# Patient Record
Sex: MALE | Race: WHITE | NOT HISPANIC OR LATINO
[De-identification: names, ages, dates, MRNs, and addresses within clinical notes are randomized per-mention and may not be internally consistent; named-entity substitution may affect disease eponyms.]

---

## 2024-07-18 ENCOUNTER — RESULT REVIEW (OUTPATIENT)
Age: 79
End: 2024-07-18

## 2024-07-25 ENCOUNTER — TRANSCRIPTION ENCOUNTER (OUTPATIENT)
Age: 79
End: 2024-07-25

## 2024-07-26 PROBLEM — Z00.00 ENCOUNTER FOR PREVENTIVE HEALTH EXAMINATION: Status: ACTIVE | Noted: 2024-07-26

## 2024-08-02 ENCOUNTER — TRANSCRIPTION ENCOUNTER (OUTPATIENT)
Age: 79
End: 2024-08-02

## 2024-08-05 ENCOUNTER — NON-APPOINTMENT (OUTPATIENT)
Age: 79
End: 2024-08-05

## 2024-08-05 ENCOUNTER — APPOINTMENT (OUTPATIENT)
Dept: FAMILY MEDICINE | Facility: CLINIC | Age: 79
End: 2024-08-05

## 2024-08-05 ENCOUNTER — LABORATORY RESULT (OUTPATIENT)
Age: 79
End: 2024-08-05

## 2024-08-05 PROBLEM — Z09 HOSPITAL DISCHARGE FOLLOW-UP: Status: ACTIVE | Noted: 2024-08-05

## 2024-08-05 PROBLEM — I48.91 ATRIAL FIBRILLATION: Status: ACTIVE | Noted: 2024-08-05

## 2024-08-05 PROBLEM — Z97.8 INDWELLING FOLEY CATHETER PRESENT: Status: ACTIVE | Noted: 2024-08-05

## 2024-08-05 PROBLEM — N39.0 UTI (URINARY TRACT INFECTION): Status: ACTIVE | Noted: 2024-08-05 | Resolved: 2024-09-04

## 2024-08-05 PROBLEM — Z87.891 FORMER SMOKER: Status: ACTIVE | Noted: 2024-08-05

## 2024-08-05 PROBLEM — R33.9 ACUTE ON CHRONIC URINARY RETENTION: Status: ACTIVE | Noted: 2024-08-05

## 2024-08-05 PROCEDURE — 99496 TRANSJ CARE MGMT HIGH F2F 7D: CPT

## 2024-08-05 NOTE — HEALTH RISK ASSESSMENT
[Yes] : Yes [Monthly or less (1 pt)] : Monthly or less (1 point) [1 or 2 (0 pts)] : 1 or 2 (0 points) [Never (0 pts)] : Never (0 points) [0] : 2) Feeling down, depressed, or hopeless: Not at all (0) [PHQ-2 Negative - No further assessment needed] : PHQ-2 Negative - No further assessment needed [Former] : Former [20 or more] : 20 or more [> 15 Years] : > 15 Years [Audit-CScore] : 1 [[id number]] : 0 [de-identified] : smoked pack per day for ~30 years

## 2024-08-05 NOTE — HISTORY OF PRESENT ILLNESS
[Post-hospitalization from ___ Hospital] : Post-hospitalization from [unfilled] Hospital [Admitted on: ___] : The patient was admitted on [unfilled] [Discharged on ___] : discharged on [unfilled] [Discharge Summary] : discharge summary [Pertinent Labs] : pertinent labs [Radiology Findings] : radiology findings [Discharge Med List] : discharge medication list [Med Reconciliation] : medication reconciliation has been completed [FreeTextEntry2] : Patient is a 79-year-old male new patient here for hospital discharge follow-up. Reports history of recently diagnosed high risk GG5 prostate cancer (PSA 39 in 6/2024, s/p prostate biopsy 7/1/24), chronic urinary retention with indwelling coronel initially placed 6/2024.   Hospital course: Presented on 7/16 with fevers, cough, urinary changes and confusion per family.  Called his prior PCP and was given oral antibiotics which she did not tolerate due to nausea/vomiting.  Admitted for sepsis secondary to UTI with metabolic encephalopathy.  He was treated with IV antibiotics of vancomycin/Zosyn and IV fluids.  During his hospitalization he had a rapid response called 7/18 for hypoxia/tachycardia/AMS. Transferred to ICU with new onset A fib RVR, hypoxic resp failure, pulm vascular congestion, severe sepsis. Switched IV merrem. Coronel exchanged by urology on 7/18. Urine Cx final result pan-sensitive E coli and E faecalis, repeat Cx sterile. 7/18 Blood Cx: E coli, presumed UTI source. Switched to IV Unasyn by ICU. ID consulted. rec to switch to augmentin 875mg bid to complete 4 weeks of treatment.  TODAY:  Presents with wife.  Has been taking Augmentin BID.  Urology Dr. Snehal Reynolds appt tomorrow.  Cardiology Dr. Foster appt next week.

## 2024-08-05 NOTE — PHYSICAL EXAM
[Grossly Normal Strength/Tone] : grossly normal strength/tone [Coordination Grossly Intact] : coordination grossly intact [No Focal Deficits] : no focal deficits [Normal Gait] : normal gait [Alert and Oriented x3] : oriented to person, place, and time [Normal] : affect was normal and insight and judgment were intact [57833 - High Complexity requires an extensive number of possible diagnoses and/or the management options, extensive complexity of the medical data (tests, etc.) to be reviewed, and a high risk of significant complications, morbidity, and/or mortality as w] : High Complexity  [de-identified] : 1+ pitting edema bilaterally [de-identified] : indwelling coronel catheter, yellow output in coronel bag

## 2024-08-13 ENCOUNTER — NON-APPOINTMENT (OUTPATIENT)
Age: 79
End: 2024-08-13

## 2024-08-13 ENCOUNTER — APPOINTMENT (OUTPATIENT)
Dept: HEART AND VASCULAR | Facility: CLINIC | Age: 79
End: 2024-08-13
Payer: MEDICARE

## 2024-08-13 VITALS
HEIGHT: 71 IN | BODY MASS INDEX: 29.16 KG/M2 | WEIGHT: 208.31 LBS | SYSTOLIC BLOOD PRESSURE: 100 MMHG | HEART RATE: 90 BPM | OXYGEN SATURATION: 96 % | DIASTOLIC BLOOD PRESSURE: 60 MMHG

## 2024-08-13 DIAGNOSIS — I48.91 UNSPECIFIED ATRIAL FIBRILLATION: ICD-10-CM

## 2024-08-13 DIAGNOSIS — I25.10 ATHEROSCLEROTIC HEART DISEASE OF NATIVE CORONARY ARTERY W/OUT ANGINA PECTORIS: ICD-10-CM

## 2024-08-13 DIAGNOSIS — M79.89 OTHER SPECIFIED SOFT TISSUE DISORDERS: ICD-10-CM

## 2024-08-13 DIAGNOSIS — I70.0 ATHEROSCLEROSIS OF AORTA: ICD-10-CM

## 2024-08-13 DIAGNOSIS — I49.3 VENTRICULAR PREMATURE DEPOLARIZATION: ICD-10-CM

## 2024-08-13 PROCEDURE — 93000 ELECTROCARDIOGRAM COMPLETE: CPT

## 2024-08-13 PROCEDURE — 99215 OFFICE O/P EST HI 40 MIN: CPT

## 2024-08-13 PROCEDURE — G2211 COMPLEX E/M VISIT ADD ON: CPT

## 2024-08-13 RX ORDER — ROSUVASTATIN CALCIUM 10 MG/1
10 TABLET, FILM COATED ORAL
Qty: 90 | Refills: 3 | Status: ACTIVE | COMMUNITY
Start: 2024-08-13 | End: 1900-01-01

## 2024-08-13 RX ORDER — METOPROLOL TARTRATE 25 MG/1
25 TABLET, FILM COATED ORAL
Qty: 90 | Refills: 3 | Status: ACTIVE | COMMUNITY
Start: 2024-08-13 | End: 1900-01-01

## 2024-08-13 NOTE — PHYSICAL EXAM
[Well Developed] : well developed [Well Nourished] : well nourished [No Acute Distress] : no acute distress [Normal Conjunctiva] : normal conjunctiva [Normal Venous Pressure] : normal venous pressure [No Carotid Bruit] : no carotid bruit [Normal S1, S2] : normal S1, S2 [No Murmur] : no murmur [No Rub] : no rub [No Gallop] : no gallop [Clear Lung Fields] : clear lung fields [Good Air Entry] : good air entry [No Respiratory Distress] : no respiratory distress  [Soft] : abdomen soft [Non Tender] : non-tender [No Masses/organomegaly] : no masses/organomegaly [Normal Bowel Sounds] : normal bowel sounds [Moves all extremities] : moves all extremities [No Focal Deficits] : no focal deficits [Normal Speech] : normal speech [No ulcers] : no ulcers [No varicosities] : no varicosities [No chronic venous stasis changes] : no chronic venous stasis changes [No cyanosis] : no cyanosis [No rashes] : no rashes [Normal peripheral pulses] : : normal peripheral pulses [de-identified] : indwelling urinary catheter [de-identified] : 1+ pitting edema b/l

## 2024-08-13 NOTE — CARDIOLOGY SUMMARY
[de-identified] : Echo: 7/18/24: 1. Technically difficult image quality. 2. Left ventricular cavity is normal in size. Left ventricular systolic function is normal with an ejection fraction visually estimated at 50 to 55 %. 3. Left ventricular endocardium is not well visualized; however, the left ventricular systolic function appears grossly normal. 4. The left ventricular diastolic function is indeterminate. 5. The right ventricle is not well visualized. probably normal right ventricular systolic function. 6. The left atrium is moderately dilated. 7. The cardiac valves are not well seen. 8. Mild mitral regurgitation. 9. No pericardial effusion seen. 10. No prior echocardiogram is available for comparison. 11. Consider NANCY if there is clinical indication. [de-identified] : CTA Chest/Abd/Pelvis 7/18/24: IMPRESSION: No pulmonary arterial emboli. Bilateral mid to upper lung predominant groundglass opacities with interlobular septal thickening suggestive of pulmonary edema. Asymmetric right mid to upper lung dependent patchy opacity suggestive of atelectasis however, clinical correlation for superimposed pneumonia is recommended. Urinary bladder wall thickening with surrounding mild perivesicular stranding which can be seen in the setting of cystitis. Clinical correlation is recommended. A few scattered bony sclerotic lesions as described above worrisome for metastatic disease given the history of prostate cancer.  Supraumbilical anterior abdominal wall fat-containing hernia. 1.1 cm internal hypodense nodule with interval increase in size since May 29, 2024 is of unclear etiology. Focused ultrasound is recommended for further evaluation as clinically warranted.

## 2024-08-13 NOTE — ASSESSMENT
[FreeTextEntry1] : 79M   New dx AFIB in setting of urosepsis CHADSVASC 3 - age and coronary/aortic atherosclerosis noted on CTA chest  HLD - , HDL 40  Bilateral Leg Swelling  EKG today NSR freq PVCs  Prior records/imaging personally reviewed from [place] hospitalization  - check BNP, update TTE.  + LE edema on exam. Will also get better assessment now that he is in sinus rhythm.  - stop aspirin, does not need to be on antiplatelet therapy in addition to full dose AC - continue eliquis 5mg BID given stroke risk.  He has had no bleeding events.  - recommend starting moderate dose statin - crestor 10mg given his coronary/aortic plaque, elevated LDL

## 2024-08-13 NOTE — ASSESSMENT
[FreeTextEntry1] : 79M   New dx AFIB in setting of urosepsis CHADSVASC 3 - age and coronary/aortic atherosclerosis noted on CTA chest  HLD - , HDL 40  Bilateral Leg Swelling  EKG today NSR freq PVCs  Prior records/imaging personally reviewed from [place] hospitalization  - check BNP, update TTE.  + LE edema on exam. Will also get better assessment now that he is in sinus rhythm.  - stop aspirin, does not need to be on antiplatelet therapy in addition to full dose AC - continue eliquis 5mg BID given stroke risk.  He has had no bleeding events.  - recommend starting moderate dose statin - crestor 10mg given his coronary/aortic plaque, elevated LDL

## 2024-08-13 NOTE — HISTORY OF PRESENT ILLNESS
[FreeTextEntry1] : 80 y/o M here today for initial post hospitalization visit. Presented to [facility] ED on 7/16 with fever tmax 104, mild cough, dark, strong smelling urine, chills, body aches, transient episode of confusion per family. He called his doctor who prescribed an antibiotic, but after taking just one pill he immediately vomited it up and has not taken any additional pills. Admitted for Sepsis UTI w/ Metabolic Encephalopathy. Treated with IV abx (Vanco/Zosyn), sepsis IVF. Initially improved on Zosyn monotherapy, however RR called 7/18 for hypoxia/tachycardia/AMS. Transferred to ICU with new onset A fib RVR, hypoxic resp failure, pulm vascular congestion, severe sepsis. Elevated troponin secondary to sepsis. Urine Cx final result pan-sensitive E coli and E faecalis, presumed UTI source. Switched to IV Unasyn by ICU, ID recommended switching to augmentin 875mg bid x 4 weeks. PAF converted to NSR on beta blocker and Eliquis. Patient improved and discharged 7/25/24.   8/13/24:  he did not start atenolol due to his baseline low/normal BPs. no cp/sob/palps/dizziness.  no bleeding on eliquis and aspirin

## 2024-08-13 NOTE — PHYSICAL EXAM
[Well Developed] : well developed [Well Nourished] : well nourished [No Acute Distress] : no acute distress [Normal Conjunctiva] : normal conjunctiva [Normal Venous Pressure] : normal venous pressure [No Carotid Bruit] : no carotid bruit [Normal S1, S2] : normal S1, S2 [No Murmur] : no murmur [No Rub] : no rub [No Gallop] : no gallop [Clear Lung Fields] : clear lung fields [Good Air Entry] : good air entry [No Respiratory Distress] : no respiratory distress  [Soft] : abdomen soft [Non Tender] : non-tender [No Masses/organomegaly] : no masses/organomegaly [Normal Bowel Sounds] : normal bowel sounds [Moves all extremities] : moves all extremities [No Focal Deficits] : no focal deficits [Normal Speech] : normal speech [No ulcers] : no ulcers [No varicosities] : no varicosities [No chronic venous stasis changes] : no chronic venous stasis changes [No cyanosis] : no cyanosis [No rashes] : no rashes [Normal peripheral pulses] : : normal peripheral pulses [de-identified] : indwelling urinary catheter [de-identified] : 1+ pitting edema b/l

## 2024-08-13 NOTE — CARDIOLOGY SUMMARY
[de-identified] : Echo: 7/18/24: 1. Technically difficult image quality. 2. Left ventricular cavity is normal in size. Left ventricular systolic function is normal with an ejection fraction visually estimated at 50 to 55 %. 3. Left ventricular endocardium is not well visualized; however, the left ventricular systolic function appears grossly normal. 4. The left ventricular diastolic function is indeterminate. 5. The right ventricle is not well visualized. probably normal right ventricular systolic function. 6. The left atrium is moderately dilated. 7. The cardiac valves are not well seen. 8. Mild mitral regurgitation. 9. No pericardial effusion seen. 10. No prior echocardiogram is available for comparison. 11. Consider NANCY if there is clinical indication. [de-identified] : CTA Chest/Abd/Pelvis 7/18/24: IMPRESSION: No pulmonary arterial emboli. Bilateral mid to upper lung predominant groundglass opacities with interlobular septal thickening suggestive of pulmonary edema. Asymmetric right mid to upper lung dependent patchy opacity suggestive of atelectasis however, clinical correlation for superimposed pneumonia is recommended. Urinary bladder wall thickening with surrounding mild perivesicular stranding which can be seen in the setting of cystitis. Clinical correlation is recommended. A few scattered bony sclerotic lesions as described above worrisome for metastatic disease given the history of prostate cancer.  Supraumbilical anterior abdominal wall fat-containing hernia. 1.1 cm internal hypodense nodule with interval increase in size since May 29, 2024 is of unclear etiology. Focused ultrasound is recommended for further evaluation as clinically warranted.

## 2024-08-23 ENCOUNTER — RESULT REVIEW (OUTPATIENT)
Age: 79
End: 2024-08-23

## 2024-08-27 ENCOUNTER — APPOINTMENT (OUTPATIENT)
Dept: HEMATOLOGY ONCOLOGY | Facility: CLINIC | Age: 79
End: 2024-08-27
Payer: MEDICARE

## 2024-08-27 VITALS
OXYGEN SATURATION: 95 % | DIASTOLIC BLOOD PRESSURE: 73 MMHG | HEART RATE: 79 BPM | HEIGHT: 71 IN | SYSTOLIC BLOOD PRESSURE: 119 MMHG | BODY MASS INDEX: 29.84 KG/M2 | RESPIRATION RATE: 16 BRPM | WEIGHT: 213.13 LBS | TEMPERATURE: 97.2 F

## 2024-08-27 DIAGNOSIS — R61 GENERALIZED HYPERHIDROSIS: ICD-10-CM

## 2024-08-27 DIAGNOSIS — C61 MALIGNANT NEOPLASM OF PROSTATE: ICD-10-CM

## 2024-08-27 DIAGNOSIS — R55 SYNCOPE AND COLLAPSE: ICD-10-CM

## 2024-08-27 PROCEDURE — G2211 COMPLEX E/M VISIT ADD ON: CPT

## 2024-08-27 PROCEDURE — G2212 PROLONG OUTPT/OFFICE VIS: CPT

## 2024-08-27 PROCEDURE — 99205 OFFICE O/P NEW HI 60 MIN: CPT

## 2024-08-27 RX ORDER — PREDNISONE 5 MG/1
5 TABLET ORAL DAILY
Qty: 30 | Refills: 11 | Status: ACTIVE | COMMUNITY
Start: 2024-08-27 | End: 1900-01-01

## 2024-08-28 ENCOUNTER — NON-APPOINTMENT (OUTPATIENT)
Age: 79
End: 2024-08-28

## 2024-08-28 PROBLEM — R61 DIAPHORESIS: Status: ACTIVE | Noted: 2024-08-28

## 2024-08-28 PROBLEM — R55 VASOVAGAL SYNCOPE: Status: ACTIVE | Noted: 2024-08-28

## 2024-08-28 NOTE — HISTORY OF PRESENT ILLNESS
[de-identified] : Mr. Don Hernandez is 79 years old male with prostate cancer here for consultation, referred by Dr.Justen Cassidy Patient is experiencing some hematuria due to the coronel and has trouble doing his ADL's due to the coronel.   Patient with hx of A fib, PVCs, UTI   2024' Final Diagnosis 1.  Right anterior: -   Prostatic adenocarcinoma (Tarlton's pattern 4+4 total score 8) grade group 4 involving 60% of the tissue and measuring 6 mm 2.  Right lateral: -   Prostatic adenocarcinoma (Sky's pattern 4+4 total score 8) grade group 4 involving 70% of the tissue and measuring 6 mm 3.  Right base posterior : -   Prostatic adenocarcinoma (Sky's pattern 4+5 total score 9) grade group 5 involving 100% and 100% of 2 of 2 cores and measuring 5 mm and 4 mm 4.  Right apex posterior: -   Prostatic adenocarcinoma (Sky's pattern 4+4 total score 8) grade group 4 involving 90% of the tissue and measuring 16 mm 5.  Right base posterior medial: -   Benign fibromuscular tissue 6.  Right apex posterior medial: -   Benign prostatic tissue 7.  Left anterior: -   Benign prostatic tissue 8.  Left lateral: -   Prostatic adenocarcinoma (Tarlton's pattern 3+3 total score 6) grade group 1 involving 5% of the tissue and measuring 1 mm 9.  Left base posterior: -   Benign prostatic tissue 10.  Left apex posterior: -   Prostatic adenocarcinoma (Tarlton's pattern 3+3 total score 6) grade group 1 involving 20% of multiple fragmented cores and measuring 2.5 mm 11.  Left base posterior medial: -   Benign prostatic tissue 12.  Left apex posterior medial: -   Prostatic adenocarcinoma (Tarlton's pattern 3+3 total score 6) grade group 1 involving 40% of the tissue and measuring 4 mm 13.  Left transition zone: -   Benign prostatic tissue 14.  Right transition zone: -   Benign prostatic tissue  2024 bone scan - no definite scintigraphic evidence of bony metastatic disease.  2024 CT C/A/P No pulmonary arterial emboli.    Bilateral mid to upper lung predominant groundglass opacities with interlobular septal thickening suggestive of pulmonary edema.    Asymmetric right mid to upper lung dependent patchy opacity suggestive of atelectasis however,  clinical correlation for superimposed pneumonia is recommended.    Urinary bladder wall thickening with surrounding mild perivesicular stranding which can be seen in the setting of cystitis. Clinical correlation is recommended.    A few scattered bony sclerotic lesions as described above worrisome for metastatic disease given  the history of prostate cancer.     Supraumbilical anterior abdominal wall fat-containing hernia. 1.1 cm internal hypodense nodule with interval increase in size since May 29, 2024 is of unclear etiology   2024 Pet/Ct PSMA      REPRODUCTIVE ORGANS: Prostate gland measures 5.3 x 4.1 cm (image activity of the prostate gland  corresponds to the urinary excretion in via Coronel catheter.). Heterogeneous, intense uptake is seen  along the RIGHT lateral peripheral zone, RIGHT posterolateral peripheral zone and RIGHT posterior  midline extending from base to apex (SUV 11.2, image 210). Activity in the center of the prostate  gland corresponds to the urinary excretion in via Coronel catheter.    ABDOMINOPELVIC LYMPH NODES/RETROPERITONEUM: Pelvic nodes are seen with increased uptake. For  example, a RIGHT pelvic sidewall node (1.0 x 0.7 cm, SUV 4.9, image 188).    BONES/SOFT TISSUES: Foci of increased skeletal uptake, including spine, ribs, pelvic bones and  proximal LEFT femur. For reference, the lesion in L1 demonstrates SUV 6.0, a sclerotic lesion in the posterior LEFT iliac measures 1.3 cm with SUV 3.6.    VESSELS: Atherosclerotic calcification of nonaneurysmal abdominal aorta including visceral and/or  runoff branches.       IMPRESSION:    1.  Uptake in the prostate gland consistent with malignant involvement.    2.  Right pelvic sidewall nodes with increased PSMA expression consistent with metastatic disease.    3.  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        FHx: Mother: Neck mass excised and reproductive cancer  at 86yo  Father: Stroke  from artherosclerosis at 58yo MGrandfather - Stomach cancer  at 81yo  MGrandmother: Reproductive cancer  at 74yo   SurgHx: Scoped urologically and Coronel placed  SocialHx: Smoked from 17yo to 51yo  Social ETOH user  Never illicit drugs

## 2024-08-28 NOTE — HISTORY OF PRESENT ILLNESS
[de-identified] : Mr. Don Hernandez is 79 years old male with prostate cancer here for consultation, referred by Dr.Justen Cassidy Patient is experiencing some hematuria due to the coronel and has trouble doing his ADL's due to the coronel.   Patient with hx of A fib, PVCs, UTI   2024' Final Diagnosis 1.  Right anterior: -   Prostatic adenocarcinoma (Pacific's pattern 4+4 total score 8) grade group 4 involving 60% of the tissue and measuring 6 mm 2.  Right lateral: -   Prostatic adenocarcinoma (Sky's pattern 4+4 total score 8) grade group 4 involving 70% of the tissue and measuring 6 mm 3.  Right base posterior : -   Prostatic adenocarcinoma (Sky's pattern 4+5 total score 9) grade group 5 involving 100% and 100% of 2 of 2 cores and measuring 5 mm and 4 mm 4.  Right apex posterior: -   Prostatic adenocarcinoma (Sky's pattern 4+4 total score 8) grade group 4 involving 90% of the tissue and measuring 16 mm 5.  Right base posterior medial: -   Benign fibromuscular tissue 6.  Right apex posterior medial: -   Benign prostatic tissue 7.  Left anterior: -   Benign prostatic tissue 8.  Left lateral: -   Prostatic adenocarcinoma (Pacific's pattern 3+3 total score 6) grade group 1 involving 5% of the tissue and measuring 1 mm 9.  Left base posterior: -   Benign prostatic tissue 10.  Left apex posterior: -   Prostatic adenocarcinoma (Pacific's pattern 3+3 total score 6) grade group 1 involving 20% of multiple fragmented cores and measuring 2.5 mm 11.  Left base posterior medial: -   Benign prostatic tissue 12.  Left apex posterior medial: -   Prostatic adenocarcinoma (Pacific's pattern 3+3 total score 6) grade group 1 involving 40% of the tissue and measuring 4 mm 13.  Left transition zone: -   Benign prostatic tissue 14.  Right transition zone: -   Benign prostatic tissue  2024 bone scan - no definite scintigraphic evidence of bony metastatic disease.  2024 CT C/A/P No pulmonary arterial emboli.    Bilateral mid to upper lung predominant groundglass opacities with interlobular septal thickening suggestive of pulmonary edema.    Asymmetric right mid to upper lung dependent patchy opacity suggestive of atelectasis however,  clinical correlation for superimposed pneumonia is recommended.    Urinary bladder wall thickening with surrounding mild perivesicular stranding which can be seen in the setting of cystitis. Clinical correlation is recommended.    A few scattered bony sclerotic lesions as described above worrisome for metastatic disease given  the history of prostate cancer.     Supraumbilical anterior abdominal wall fat-containing hernia. 1.1 cm internal hypodense nodule with interval increase in size since May 29, 2024 is of unclear etiology   2024 Pet/Ct PSMA      REPRODUCTIVE ORGANS: Prostate gland measures 5.3 x 4.1 cm (image activity of the prostate gland  corresponds to the urinary excretion in via Coronel catheter.). Heterogeneous, intense uptake is seen  along the RIGHT lateral peripheral zone, RIGHT posterolateral peripheral zone and RIGHT posterior  midline extending from base to apex (SUV 11.2, image 210). Activity in the center of the prostate  gland corresponds to the urinary excretion in via Coronel catheter.    ABDOMINOPELVIC LYMPH NODES/RETROPERITONEUM: Pelvic nodes are seen with increased uptake. For  example, a RIGHT pelvic sidewall node (1.0 x 0.7 cm, SUV 4.9, image 188).    BONES/SOFT TISSUES: Foci of increased skeletal uptake, including spine, ribs, pelvic bones and  proximal LEFT femur. For reference, the lesion in L1 demonstrates SUV 6.0, a sclerotic lesion in the posterior LEFT iliac measures 1.3 cm with SUV 3.6.    VESSELS: Atherosclerotic calcification of nonaneurysmal abdominal aorta including visceral and/or  runoff branches.       IMPRESSION:    1.  Uptake in the prostate gland consistent with malignant involvement.    2.  Right pelvic sidewall nodes with increased PSMA expression consistent with metastatic disease.    3.  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        FHx: Mother: Neck mass excised and reproductive cancer  at 86yo  Father: Stroke  from artherosclerosis at 58yo MGrandfather - Stomach cancer  at 79yo  MGrandmother: Reproductive cancer  at 72yo   SurgHx: Scoped urologically and Coronel placed  SocialHx: Smoked from 17yo to 51yo  Social ETOH user  Never illicit drugs

## 2024-08-28 NOTE — PHYSICAL EXAM
[Ambulatory and capable of all self care but unable to carry out any work activities] : Status 2- Ambulatory and capable of all self care but unable to carry out any work activities. Up and about more than 50% of waking hours [Normal] : affect appropriate [de-identified] : Has indwelling Mcneal

## 2024-08-28 NOTE — PHYSICAL EXAM
[Ambulatory and capable of all self care but unable to carry out any work activities] : Status 2- Ambulatory and capable of all self care but unable to carry out any work activities. Up and about more than 50% of waking hours [Normal] : affect appropriate [de-identified] : Has indwelling Mcneal

## 2024-08-28 NOTE — ASSESSMENT
[FreeTextEntry1] : Prostate Cancer Sky 4+5=9 Diagnosed 7/5/24 PSMA PET (8/6/24): Uptake ine prostate, right pelvic side wall and bone mets in axial and appendicular Follows with Dr Reynolds Symptoms: frequent urination until May 2024- he became obstructed s/p Foleys  No pain Discussed at length about the diagnosis, work up, staging, prognosis and treatment options Explained to the patient and the family that he has metastatic prostate cancer which is essentially incurable.  However it is highly treatable. Discussed about the PEACE 1 trial which showed combination of Lupron with docetaxel and abiraterone in metastatic castration sensitive prostate cancer improved overall survival and radiologic progression free survival. We also reviewed the stampede trial, which showed in patients with metastatic prostate cancer ADT plus abiraterone significantly improved overall survival and failure free survival compared to ADT alone He and his family wants to opt for a nonchemo containing regimen Discussed about ADT -> risks, benefits and side effects including fatigue, hot flashes, weight gain, mood swings, decreased libido, erectile dysfunction, increased risk of heart disease and strokes. Also discussed about risk of blood clots generalized bodyaches, myalgia, arthralgia, stiffness and osteoporosis Also discussed about bone agent for bone metastasis after he obtains dental clearance. Plan: Send blood work including CBC, CMP, PSA, testosterone Abiraterone 1000 mg p.o. prednisone 5 mg daily Lupron: He received his first dose of 22.5 mg with Dr. Reynolds on 8/16/2024.  He will continue every 3 months Lupron with his urologist Russel every 3 months  Lifestyle changes: Discussed about lifestyle changes especially given increased risk of cardiac issues, stroke with ADT.  Advised to aim for a goal of 150 minutes/week of cardio.  This can include walking.  He can start of by walking for 5 minutes a day and gradually increase it to achieve the goal. Discussed at length about the diet especially trying whole food plant-based diet for 1 to 3 months. Patient can continue longer if desired. Available evidence about the benefits reviewed and discussed with the patient.  Resources in the form of books, websites, documentaries given to the patient in writing.  Addendum: Towards the end of the encounter, patient started feeling dizzy, became diaphoretic and had a brief [3 to 5 seconds, of syncopal episode.  He did eat his breakfast in the morning Blood pressure was 90s over 50s.  He soon came back and reports that he already started to feel better.  Also received orange juice.  Rapid response was called and he was sent to the emergency room.  Case endorsed to the ED teamteam  Social Hx Lives in [place], [place] with wife Retired  at [employer] Smoked from 17yo to 53yo Social ETOH user Never illicit drugs.  Patient, wife [Nurys], son [Naveen] had multiple questions which were answered to satisfaction  Follow-up in a few weeks No labs  .

## 2024-08-28 NOTE — HISTORY OF PRESENT ILLNESS
[de-identified] : Mr. Don Hernandez is 79 years old male with prostate cancer here for consultation, referred by Dr.Justen Cassidy Patient is experiencing some hematuria due to the coronel and has trouble doing his ADL's due to the coronel.   Patient with hx of A fib, PVCs, UTI   2024' Final Diagnosis 1.  Right anterior: -   Prostatic adenocarcinoma (Greenfield's pattern 4+4 total score 8) grade group 4 involving 60% of the tissue and measuring 6 mm 2.  Right lateral: -   Prostatic adenocarcinoma (Sky's pattern 4+4 total score 8) grade group 4 involving 70% of the tissue and measuring 6 mm 3.  Right base posterior : -   Prostatic adenocarcinoma (Sky's pattern 4+5 total score 9) grade group 5 involving 100% and 100% of 2 of 2 cores and measuring 5 mm and 4 mm 4.  Right apex posterior: -   Prostatic adenocarcinoma (Sky's pattern 4+4 total score 8) grade group 4 involving 90% of the tissue and measuring 16 mm 5.  Right base posterior medial: -   Benign fibromuscular tissue 6.  Right apex posterior medial: -   Benign prostatic tissue 7.  Left anterior: -   Benign prostatic tissue 8.  Left lateral: -   Prostatic adenocarcinoma (Greenfield's pattern 3+3 total score 6) grade group 1 involving 5% of the tissue and measuring 1 mm 9.  Left base posterior: -   Benign prostatic tissue 10.  Left apex posterior: -   Prostatic adenocarcinoma (Greenfield's pattern 3+3 total score 6) grade group 1 involving 20% of multiple fragmented cores and measuring 2.5 mm 11.  Left base posterior medial: -   Benign prostatic tissue 12.  Left apex posterior medial: -   Prostatic adenocarcinoma (Greenfield's pattern 3+3 total score 6) grade group 1 involving 40% of the tissue and measuring 4 mm 13.  Left transition zone: -   Benign prostatic tissue 14.  Right transition zone: -   Benign prostatic tissue  2024 bone scan - no definite scintigraphic evidence of bony metastatic disease.  2024 CT C/A/P No pulmonary arterial emboli.    Bilateral mid to upper lung predominant groundglass opacities with interlobular septal thickening suggestive of pulmonary edema.    Asymmetric right mid to upper lung dependent patchy opacity suggestive of atelectasis however,  clinical correlation for superimposed pneumonia is recommended.    Urinary bladder wall thickening with surrounding mild perivesicular stranding which can be seen in the setting of cystitis. Clinical correlation is recommended.    A few scattered bony sclerotic lesions as described above worrisome for metastatic disease given  the history of prostate cancer.     Supraumbilical anterior abdominal wall fat-containing hernia. 1.1 cm internal hypodense nodule with interval increase in size since May 29, 2024 is of unclear etiology   2024 Pet/Ct [facility]      REPRODUCTIVE ORGANS: Prostate gland measures 5.3 x 4.1 cm (image activity of the prostate gland  corresponds to the urinary excretion in via Coronel catheter.). Heterogeneous, intense uptake is seen  along the RIGHT lateral peripheral zone, RIGHT posterolateral peripheral zone and RIGHT posterior  midline extending from base to apex (SUV 11.2, image 210). Activity in the center of the prostate  gland corresponds to the urinary excretion in via Coronel catheter.    ABDOMINOPELVIC LYMPH NODES/RETROPERITONEUM: Pelvic nodes are seen with increased uptake. For  example, a RIGHT pelvic sidewall node (1.0 x 0.7 cm, SUV 4.9, image 188).    BONES/SOFT TISSUES: Foci of increased skeletal uptake, including spine, ribs, pelvic bones and  proximal LEFT femur. For reference, the lesion in L1 demonstrates SUV 6.0, a sclerotic lesion in the posterior LEFT iliac measures 1.3 cm with SUV 3.6.    VESSELS: Atherosclerotic calcification of nonaneurysmal abdominal aorta including visceral and/or  runoff branches.       IMPRESSION:    1.  Uptake in the prostate gland consistent with malignant involvement.    2.  Right pelvic sidewall nodes with increased PSMA expression consistent with metastatic disease.    3.  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        FHx: Mother: Neck mass excised and reproductive cancer  at 86yo  Father: Stroke  from artherosclerosis at 60yo MGrandfather - Stomach cancer  at 81yo  MGrandmother: Reproductive cancer  at 74yo   SurgHx: Scoped urologically and Coronel placed  SocialHx: Smoked from 17yo to 51yo  Social ETOH user  Never illicit drugs

## 2024-08-28 NOTE — ASSESSMENT
[FreeTextEntry1] : Prostate Cancer Sky 4+5=9 Diagnosed 7/5/24 PSMA PET (8/6/24): Uptake ine prostate, right pelvic side wall and bone mets in axial and appendicular Follows with Dr Reynolds Symptoms: frequent urination until May 2024- he became obstructed s/p Foleys  No pain Discussed at length about the diagnosis, work up, staging, prognosis and treatment options Explained to the patient and the family that he has metastatic prostate cancer which is essentially incurable.  However it is highly treatable. Discussed about the PEACE 1 trial which showed combination of Lupron with docetaxel and abiraterone in metastatic castration sensitive prostate cancer improved overall survival and radiologic progression free survival. We also reviewed the stampede trial, which showed in patients with metastatic prostate cancer ADT plus abiraterone significantly improved overall survival and failure free survival compared to ADT alone He and his family wants to opt for a nonchemo containing regimen Discussed about ADT -> risks, benefits and side effects including fatigue, hot flashes, weight gain, mood swings, decreased libido, erectile dysfunction, increased risk of heart disease and strokes. Also discussed about risk of blood clots generalized bodyaches, myalgia, arthralgia, stiffness and osteoporosis Also discussed about bone agent for bone metastasis after he obtains dental clearance. Plan: Send blood work including CBC, CMP, PSA, testosterone Abiraterone 1000 mg p.o. prednisone 5 mg daily Lupron: He received his first dose of 22.5 mg with Dr. Reynolds on 8/16/2024.  He will continue every 3 months Lupron with his urologist Russel every 3 months  Lifestyle changes: Discussed about lifestyle changes especially given increased risk of cardiac issues, stroke with ADT.  Advised to aim for a goal of 150 minutes/week of cardio.  This can include walking.  He can start of by walking for 5 minutes a day and gradually increase it to achieve the goal. Discussed at length about the diet especially trying whole food plant-based diet for 1 to 3 months. Patient can continue longer if desired. Available evidence about the benefits reviewed and discussed with the patient.  Resources in the form of books, websites, documentaries given to the patient in writing.  Addendum: Towards the end of the encounter, patient started feeling dizzy, became diaphoretic and had a brief [3 to 5 seconds, of syncopal episode.  He did eat his breakfast in the morning Blood pressure was 90s over 50s.  He soon came back and reports that he already started to feel better.  Also received orange juice.  Rapid response was called and he was sent to the emergency room.  Case endorsed to the ED teamteam  Social Hx Lives in [place], [place] with wife Retired  at [employer] Smoked from 15yo to 53yo Social ETOH user Never illicit drugs.  Patient, wife [Nurys], son [Naveen] had multiple questions which were answered to satisfaction  Follow-up in a few weeks No labs  .

## 2024-08-28 NOTE — HISTORY OF PRESENT ILLNESS
[de-identified] : Mr. Don Hernandez is 79 years old male with prostate cancer here for consultation, referred by Dr.Justen Cassidy Patient is experiencing some hematuria due to the coronel and has trouble doing his ADL's due to the coronel.   Patient with hx of A fib, PVCs, UTI   2024' Final Diagnosis 1.  Right anterior: -   Prostatic adenocarcinoma (Hansville's pattern 4+4 total score 8) grade group 4 involving 60% of the tissue and measuring 6 mm 2.  Right lateral: -   Prostatic adenocarcinoma (Sky's pattern 4+4 total score 8) grade group 4 involving 70% of the tissue and measuring 6 mm 3.  Right base posterior : -   Prostatic adenocarcinoma (Sky's pattern 4+5 total score 9) grade group 5 involving 100% and 100% of 2 of 2 cores and measuring 5 mm and 4 mm 4.  Right apex posterior: -   Prostatic adenocarcinoma (Sky's pattern 4+4 total score 8) grade group 4 involving 90% of the tissue and measuring 16 mm 5.  Right base posterior medial: -   Benign fibromuscular tissue 6.  Right apex posterior medial: -   Benign prostatic tissue 7.  Left anterior: -   Benign prostatic tissue 8.  Left lateral: -   Prostatic adenocarcinoma (Hansville's pattern 3+3 total score 6) grade group 1 involving 5% of the tissue and measuring 1 mm 9.  Left base posterior: -   Benign prostatic tissue 10.  Left apex posterior: -   Prostatic adenocarcinoma (Hansville's pattern 3+3 total score 6) grade group 1 involving 20% of multiple fragmented cores and measuring 2.5 mm 11.  Left base posterior medial: -   Benign prostatic tissue 12.  Left apex posterior medial: -   Prostatic adenocarcinoma (Hansville's pattern 3+3 total score 6) grade group 1 involving 40% of the tissue and measuring 4 mm 13.  Left transition zone: -   Benign prostatic tissue 14.  Right transition zone: -   Benign prostatic tissue  2024 bone scan - no definite scintigraphic evidence of bony metastatic disease.  2024 CT C/A/P No pulmonary arterial emboli.    Bilateral mid to upper lung predominant groundglass opacities with interlobular septal thickening suggestive of pulmonary edema.    Asymmetric right mid to upper lung dependent patchy opacity suggestive of atelectasis however,  clinical correlation for superimposed pneumonia is recommended.    Urinary bladder wall thickening with surrounding mild perivesicular stranding which can be seen in the setting of cystitis. Clinical correlation is recommended.    A few scattered bony sclerotic lesions as described above worrisome for metastatic disease given  the history of prostate cancer.     Supraumbilical anterior abdominal wall fat-containing hernia. 1.1 cm internal hypodense nodule with interval increase in size since May 29, 2024 is of unclear etiology   2024 Pet/Ct PSMA      REPRODUCTIVE ORGANS: Prostate gland measures 5.3 x 4.1 cm (image activity of the prostate gland  corresponds to the urinary excretion in via Coronel catheter.). Heterogeneous, intense uptake is seen  along the RIGHT lateral peripheral zone, RIGHT posterolateral peripheral zone and RIGHT posterior  midline extending from base to apex (SUV 11.2, image 210). Activity in the center of the prostate  gland corresponds to the urinary excretion in via Coronel catheter.    ABDOMINOPELVIC LYMPH NODES/RETROPERITONEUM: Pelvic nodes are seen with increased uptake. For  example, a RIGHT pelvic sidewall node (1.0 x 0.7 cm, SUV 4.9, image 188).    BONES/SOFT TISSUES: Foci of increased skeletal uptake, including spine, ribs, pelvic bones and  proximal LEFT femur. For reference, the lesion in L1 demonstrates SUV 6.0, a sclerotic lesion in the posterior LEFT iliac measures 1.3 cm with SUV 3.6.    VESSELS: Atherosclerotic calcification of nonaneurysmal abdominal aorta including visceral and/or  runoff branches.       IMPRESSION:    1.  Uptake in the prostate gland consistent with malignant involvement.    2.  Right pelvic sidewall nodes with increased PSMA expression consistent with metastatic disease.    3.  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        FHx: Mother: Neck mass excised and reproductive cancer  at 88yo  Father: Stroke  from artherosclerosis at 58yo MGrandfather - Stomach cancer  at 81yo  MGrandmother: Reproductive cancer  at 74yo   SurgHx: Scoped urologically and Coronel placed  SocialHx: Smoked from 17yo to 51yo  Social ETOH user  Never illicit drugs

## 2024-08-28 NOTE — ASSESSMENT
[FreeTextEntry1] : Prostate Cancer Sky 4+5=9 Diagnosed 7/5/24 PSMA PET (8/6/24): Uptake ine prostate, right pelvic side wall and bone mets in axial and appendicular Follows with Dr Reynolds Symptoms: frequent urination until May 2024- he became obstructed s/p Foleys  No pain Discussed at length about the diagnosis, work up, staging, prognosis and treatment options Explained to the patient and the family that he has metastatic prostate cancer which is essentially incurable.  However it is highly treatable. Discussed about the PEACE 1 trial which showed combination of Lupron with docetaxel and abiraterone in metastatic castration sensitive prostate cancer improved overall survival and radiologic progression free survival. We also reviewed the stampede trial, which showed in patients with metastatic prostate cancer ADT plus abiraterone significantly improved overall survival and failure free survival compared to ADT alone He and his family wants to opt for a nonchemo containing regimen Discussed about ADT -> risks, benefits and side effects including fatigue, hot flashes, weight gain, mood swings, decreased libido, erectile dysfunction, increased risk of heart disease and strokes. Also discussed about risk of blood clots generalized bodyaches, myalgia, arthralgia, stiffness and osteoporosis Also discussed about bone agent for bone metastasis after he obtains dental clearance. Plan: Send blood work including CBC, CMP, PSA, testosterone Abiraterone 1000 mg p.o. prednisone 5 mg daily Lupron: He received his first dose of 22.5 mg with Dr. Reynolds on 8/16/2024.  He will continue every 3 months Lupron with his urologist Russel every 3 months  Lifestyle changes: Discussed about lifestyle changes especially given increased risk of cardiac issues, stroke with ADT.  Advised to aim for a goal of 150 minutes/week of cardio.  This can include walking.  He can start of by walking for 5 minutes a day and gradually increase it to achieve the goal. Discussed at length about the diet especially trying whole food plant-based diet for 1 to 3 months. Patient can continue longer if desired. Available evidence about the benefits reviewed and discussed with the patient.  Resources in the form of books, websites, documentaries given to the patient in writing.  Addendum: Towards the end of the encounter, patient started feeling dizzy, became diaphoretic and had a brief [3 to 5 seconds, of syncopal episode.  He did eat his breakfast in the morning Blood pressure was 90s over 50s.  He soon came back and reports that he already started to feel better.  Also received orange juice.  Rapid response was called and he was sent to the emergency room.  Case endorsed to the ED teamteam  Social Hx Lives in [place], [place] with wife Retired  at [employer] Smoked from 15yo to 51yo Social ETOH user Never illicit drugs.  Patient, wife [Nurys], son [Naveen] had multiple questions which were answered to satisfaction  Follow-up in a few weeks No labs  .

## 2024-08-28 NOTE — PHYSICAL EXAM
[Ambulatory and capable of all self care but unable to carry out any work activities] : Status 2- Ambulatory and capable of all self care but unable to carry out any work activities. Up and about more than 50% of waking hours [Normal] : affect appropriate [de-identified] : Has indwelling Mcneal

## 2024-08-28 NOTE — PHYSICAL EXAM
[Ambulatory and capable of all self care but unable to carry out any work activities] : Status 2- Ambulatory and capable of all self care but unable to carry out any work activities. Up and about more than 50% of waking hours [Normal] : affect appropriate [de-identified] : Has indwelling Mcneal

## 2024-08-28 NOTE — ASSESSMENT
[FreeTextEntry1] : Prostate Cancer Sky 4+5=9 Diagnosed 7/5/24 PSMA PET (8/6/24): Uptake ine prostate, right pelvic side wall and bone mets in axial and appendicular Follows with Dr Reynolds Symptoms: frequent urination until May 2024- he became obstructed s/p Foleys  No pain Discussed at length about the diagnosis, work up, staging, prognosis and treatment options Explained to the patient and the family that he has metastatic prostate cancer which is essentially incurable.  However it is highly treatable. Discussed about the PEACE 1 trial which showed combination of Lupron with docetaxel and abiraterone in metastatic castration sensitive prostate cancer improved overall survival and radiologic progression free survival. We also reviewed the stampede trial, which showed in patients with metastatic prostate cancer ADT plus abiraterone significantly improved overall survival and failure free survival compared to ADT alone He and his family wants to opt for a nonchemo containing regimen Discussed about ADT -> risks, benefits and side effects including fatigue, hot flashes, weight gain, mood swings, decreased libido, erectile dysfunction, increased risk of heart disease and strokes. Also discussed about risk of blood clots generalized bodyaches, myalgia, arthralgia, stiffness and osteoporosis Also discussed about bone agent for bone metastasis after he obtains dental clearance. Plan: Send blood work including CBC, CMP, PSA, testosterone Abiraterone 1000 mg p.o. prednisone 5 mg daily Lupron: He received his first dose of 22.5 mg with Dr. Reynolds on 8/16/2024.  He will continue every 3 months Lupron with his urologist Russel every 3 months  Lifestyle changes: Discussed about lifestyle changes especially given increased risk of cardiac issues, stroke with ADT.  Advised to aim for a goal of 150 minutes/week of cardio.  This can include walking.  He can start of by walking for 5 minutes a day and gradually increase it to achieve the goal. Discussed at length about the diet especially trying whole food plant-based diet for 1 to 3 months. Patient can continue longer if desired. Available evidence about the benefits reviewed and discussed with the patient.  Resources in the form of books, websites, documentaries given to the patient in writing.  Addendum: Towards the end of the encounter, patient started feeling dizzy, became diaphoretic and had a brief [3 to 5 seconds, of syncopal episode.  He did eat his breakfast in the morning Blood pressure was 90s over 50s.  He soon came back and reports that he already started to feel better.  Also received orange juice.  Rapid response was called and he was sent to the emergency room.  Case endorsed to the ED teamteam  Social Hx Lives in [place], [place] with wife Retired  at [employer] Smoked from 15yo to 53yo Social ETOH user Never illicit drugs.  Patient, wife [Nurys], son [Naveen] had multiple questions which were answered to satisfaction  Follow-up in a few weeks No labs  .

## 2024-08-28 NOTE — PHYSICAL EXAM
[Ambulatory and capable of all self care but unable to carry out any work activities] : Status 2- Ambulatory and capable of all self care but unable to carry out any work activities. Up and about more than 50% of waking hours [Normal] : affect appropriate [de-identified] : Has indwelling Mcneal

## 2024-08-28 NOTE — HISTORY OF PRESENT ILLNESS
[de-identified] : Mr. Don Hernandez is 79 years old male with prostate cancer here for consultation, referred by Dr.Justen Cassidy Patient is experiencing some hematuria due to the coronel and has trouble doing his ADL's due to the coronel.   Patient with hx of A fib, PVCs, UTI   2024' Final Diagnosis 1.  Right anterior: -   Prostatic adenocarcinoma (Cameron's pattern 4+4 total score 8) grade group 4 involving 60% of the tissue and measuring 6 mm 2.  Right lateral: -   Prostatic adenocarcinoma (Sky's pattern 4+4 total score 8) grade group 4 involving 70% of the tissue and measuring 6 mm 3.  Right base posterior : -   Prostatic adenocarcinoma (Sky's pattern 4+5 total score 9) grade group 5 involving 100% and 100% of 2 of 2 cores and measuring 5 mm and 4 mm 4.  Right apex posterior: -   Prostatic adenocarcinoma (Sky's pattern 4+4 total score 8) grade group 4 involving 90% of the tissue and measuring 16 mm 5.  Right base posterior medial: -   Benign fibromuscular tissue 6.  Right apex posterior medial: -   Benign prostatic tissue 7.  Left anterior: -   Benign prostatic tissue 8.  Left lateral: -   Prostatic adenocarcinoma (Cameron's pattern 3+3 total score 6) grade group 1 involving 5% of the tissue and measuring 1 mm 9.  Left base posterior: -   Benign prostatic tissue 10.  Left apex posterior: -   Prostatic adenocarcinoma (Cameron's pattern 3+3 total score 6) grade group 1 involving 20% of multiple fragmented cores and measuring 2.5 mm 11.  Left base posterior medial: -   Benign prostatic tissue 12.  Left apex posterior medial: -   Prostatic adenocarcinoma (Cameron's pattern 3+3 total score 6) grade group 1 involving 40% of the tissue and measuring 4 mm 13.  Left transition zone: -   Benign prostatic tissue 14.  Right transition zone: -   Benign prostatic tissue  2024 bone scan - no definite scintigraphic evidence of bony metastatic disease.  2024 CT C/A/P No pulmonary arterial emboli.    Bilateral mid to upper lung predominant groundglass opacities with interlobular septal thickening suggestive of pulmonary edema.    Asymmetric right mid to upper lung dependent patchy opacity suggestive of atelectasis however,  clinical correlation for superimposed pneumonia is recommended.    Urinary bladder wall thickening with surrounding mild perivesicular stranding which can be seen in the setting of cystitis. Clinical correlation is recommended.    A few scattered bony sclerotic lesions as described above worrisome for metastatic disease given  the history of prostate cancer.     Supraumbilical anterior abdominal wall fat-containing hernia. 1.1 cm internal hypodense nodule with interval increase in size since May 29, 2024 is of unclear etiology   2024 Pet/Ct PSMA      REPRODUCTIVE ORGANS: Prostate gland measures 5.3 x 4.1 cm (image activity of the prostate gland  corresponds to the urinary excretion in via Coronel catheter.). Heterogeneous, intense uptake is seen  along the RIGHT lateral peripheral zone, RIGHT posterolateral peripheral zone and RIGHT posterior  midline extending from base to apex (SUV 11.2, image 210). Activity in the center of the prostate  gland corresponds to the urinary excretion in via Coronel catheter.    ABDOMINOPELVIC LYMPH NODES/RETROPERITONEUM: Pelvic nodes are seen with increased uptake. For  example, a RIGHT pelvic sidewall node (1.0 x 0.7 cm, SUV 4.9, image 188).    BONES/SOFT TISSUES: Foci of increased skeletal uptake, including spine, ribs, pelvic bones and  proximal LEFT femur. For reference, the lesion in L1 demonstrates SUV 6.0, a sclerotic lesion in the posterior LEFT iliac measures 1.3 cm with SUV 3.6.    VESSELS: Atherosclerotic calcification of nonaneurysmal abdominal aorta including visceral and/or  runoff branches.       IMPRESSION:    1.  Uptake in the prostate gland consistent with malignant involvement.    2.  Right pelvic sidewall nodes with increased PSMA expression consistent with metastatic disease.    3.  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        FHx: Mother: Neck mass excised and reproductive cancer  at 88yo  Father: Stroke  from artherosclerosis at 58yo MGrandfather - Stomach cancer  at 81yo  MGrandmother: Reproductive cancer  at 72yo   SurgHx: Scoped urologically and Coronel placed  SocialHx: Smoked from 17yo to 51yo  Social ETOH user  Never illicit drugs

## 2024-08-30 ENCOUNTER — APPOINTMENT (OUTPATIENT)
Dept: HEMATOLOGY ONCOLOGY | Facility: CLINIC | Age: 79
End: 2024-08-30

## 2024-08-30 ENCOUNTER — RESULT REVIEW (OUTPATIENT)
Age: 79
End: 2024-08-30

## 2024-08-30 VITALS
WEIGHT: 210.13 LBS | TEMPERATURE: 97.1 F | BODY MASS INDEX: 29.42 KG/M2 | DIASTOLIC BLOOD PRESSURE: 69 MMHG | HEART RATE: 76 BPM | OXYGEN SATURATION: 97 % | SYSTOLIC BLOOD PRESSURE: 117 MMHG | HEIGHT: 71 IN | RESPIRATION RATE: 16 BRPM

## 2024-08-30 RX ORDER — ABIRATERONE ACETATE 250 MG/1
250 TABLET, FILM COATED ORAL DAILY
Qty: 120 | Refills: 11 | Status: ACTIVE | COMMUNITY
Start: 2024-08-30 | End: 1900-01-01

## 2024-08-30 RX ORDER — ABIRATERONE ACETATE 500 MG/1
500 TABLET, FILM COATED ORAL
Qty: 180 | Refills: 3 | Status: DISCONTINUED | COMMUNITY
Start: 2024-08-27 | End: 2024-08-30

## 2024-09-03 ENCOUNTER — RESULT REVIEW (OUTPATIENT)
Age: 79
End: 2024-09-03

## 2024-09-05 ENCOUNTER — APPOINTMENT (OUTPATIENT)
Dept: HEMATOLOGY ONCOLOGY | Facility: CLINIC | Age: 79
End: 2024-09-05
Payer: MEDICARE

## 2024-09-05 ENCOUNTER — APPOINTMENT (OUTPATIENT)
Dept: HEART AND VASCULAR | Facility: CLINIC | Age: 79
End: 2024-09-05

## 2024-09-05 VITALS
DIASTOLIC BLOOD PRESSURE: 67 MMHG | TEMPERATURE: 97.1 F | BODY MASS INDEX: 29.6 KG/M2 | OXYGEN SATURATION: 99 % | SYSTOLIC BLOOD PRESSURE: 128 MMHG | RESPIRATION RATE: 18 BRPM | HEART RATE: 74 BPM | WEIGHT: 211.44 LBS | HEIGHT: 71 IN

## 2024-09-05 DIAGNOSIS — C61 MALIGNANT NEOPLASM OF PROSTATE: ICD-10-CM

## 2024-09-05 DIAGNOSIS — C79.51 MALIGNANT NEOPLASM OF PROSTATE: ICD-10-CM

## 2024-09-05 PROCEDURE — 99214 OFFICE O/P EST MOD 30 MIN: CPT

## 2024-09-05 PROCEDURE — G2211 COMPLEX E/M VISIT ADD ON: CPT

## 2024-09-05 RX ORDER — BICALUTAMIDE 50 MG/1
TABLET ORAL
Refills: 0 | Status: ACTIVE | COMMUNITY

## 2024-09-05 RX ORDER — APIXABAN 5 MG/1
TABLET, FILM COATED ORAL
Refills: 0 | Status: ACTIVE | COMMUNITY

## 2024-09-05 NOTE — REASON FOR VISIT
[Initial Consultation] : an initial consultation [Follow-Up Visit] : a follow-up [FreeTextEntry2] : prostate cancer

## 2024-09-05 NOTE — PHYSICAL EXAM
[Ambulatory and capable of all self care but unable to carry out any work activities] : Status 2- Ambulatory and capable of all self care but unable to carry out any work activities. Up and about more than 50% of waking hours [Normal] : affect appropriate [de-identified] : Has indwelling Mcneal

## 2024-09-05 NOTE — ASSESSMENT
[FreeTextEntry1] : Prostate Cancer Sky 4+5=9 Diagnosed 7/5/24 PSMA PET (8/6/24): Uptake in prostate, right pelvic side wall and bone mets in axial and appendicular Follows with Dr Reynolds Symptoms: frequent urination until May 2024- he became obstructed s/p Foleys  No pain  He is seen today for follow-up His workup in the emergency room was negative and was discharged home the same day No further episodes of passing out He continues to take Casodex daily Due for Lupron in November 2024 which she will get with Dr Reynolds Awaiting insurance approval and shipment of abiraterone and prednisone His insurance approved 250 mg x 4 pills only and not for 100 mg x 2 pills I strongly reiterated for him to stop taking Casodex once he receives and begins abiraterone Also discussed about bone health given extensive bony metastasis.  Recommend Zometa every 3 months He has average dentition and does not follow with dentist.  I advised him to see a dentist and obtain dental clearance prior to starting Zometa Plan: Abiraterone 1000 mg p.o. prednisone 5 mg daily Lupron: He received his first dose of 22.5 mg with Dr. Reynolds on 8/16/2024.  He will continue every 3 months Lupron with his urologist Zometa every 3 months  Social Hx Lives in [place], [place] with wife Retired  at [employer] Smoked from 17yo to 51yo Social ETOH user Never illicit drugs.  Patient, wife [Nurys], son [Naveen] had multiple questions which were answered to satisfaction  Follow-up in a 5 to 6 weeks to begin Zometa as long as he has dental clearance CBC, CMP, PSA  .

## 2024-09-05 NOTE — ASSESSMENT
[FreeTextEntry1] : Prostate Cancer Sky 4+5=9 Diagnosed 7/5/24 PSMA PET (8/6/24): Uptake in prostate, right pelvic side wall and bone mets in axial and appendicular Follows with Dr Renyolds Symptoms: frequent urination until May 2024- he became obstructed s/p Foleys  No pain  He is seen today for follow-up His workup in the emergency room was negative and was discharged home the same day No further episodes of passing out He continues to take Casodex daily Due for Lupron in November 2024 which she will get with Dr Reynolds Awaiting insurance approval and shipment of abiraterone and prednisone His insurance approved 250 mg x 4 pills only and not for 100 mg x 2 pills I strongly reiterated for him to stop taking Casodex once he receives and begins abiraterone Also discussed about bone health given extensive bony metastasis.  Recommend Zometa every 3 months He has average dentition and does not follow with dentist.  I advised him to see a dentist and obtain dental clearance prior to starting Zometa Plan: Abiraterone 1000 mg p.o. prednisone 5 mg daily Lupron: He received his first dose of 22.5 mg with Dr. Reynolds on 8/16/2024.  He will continue every 3 months Lupron with his urologist Zometa every 3 months  Social Hx Lives in [place], [place] with wife Retired  at [employer] Smoked from 15yo to 53yo Social ETOH user Never illicit drugs.  Patient, wife [Nurys], son [aNveen] had multiple questions which were answered to satisfaction  Follow-up in a 5 to 6 weeks to begin Zometa as long as he has dental clearance CBC, CMP, PSA  .

## 2024-09-05 NOTE — HISTORY OF PRESENT ILLNESS
[de-identified] : Mr. Don Hernandez is 79 years old male with prostate cancer here for consultation, referred by Dr.Justen Cassidy Patient is experiencing some hematuria due to the coronel and has trouble doing his ADL's due to the coronel.   Patient with hx of A fib, PVCs, UTI   2024' Final Diagnosis 1.  Right anterior: -   Prostatic adenocarcinoma ([place]'s pattern 4+4 total score 8) grade group 4 involving 60% of the tissue and measuring 6 mm 2.  Right lateral: -   Prostatic adenocarcinoma (Sky's pattern 4+4 total score 8) grade group 4 involving 70% of the tissue and measuring 6 mm 3.  Right base posterior : -   Prostatic adenocarcinoma (Sky's pattern 4+5 total score 9) grade group 5 involving 100% and 100% of 2 of 2 cores and measuring 5 mm and 4 mm 4.  Right apex posterior: -   Prostatic adenocarcinoma (Sky's pattern 4+4 total score 8) grade group 4 involving 90% of the tissue and measuring 16 mm 5.  Right base posterior medial: -   Benign fibromuscular tissue 6.  Right apex posterior medial: -   Benign prostatic tissue 7.  Left anterior: -   Benign prostatic tissue 8.  Left lateral: -   Prostatic adenocarcinoma ([place]'s pattern 3+3 total score 6) grade group 1 involving 5% of the tissue and measuring 1 mm 9.  Left base posterior: -   Benign prostatic tissue 10.  Left apex posterior: -   Prostatic adenocarcinoma ([place]'s pattern 3+3 total score 6) grade group 1 involving 20% of multiple fragmented cores and measuring 2.5 mm 11.  Left base posterior medial: -   Benign prostatic tissue 12.  Left apex posterior medial: -   Prostatic adenocarcinoma ([place]'s pattern 3+3 total score 6) grade group 1 involving 40% of the tissue and measuring 4 mm 13.  Left transition zone: -   Benign prostatic tissue 14.  Right transition zone: -   Benign prostatic tissue  2024 bone scan - no definite scintigraphic evidence of bony metastatic disease.  2024 CT C/A/P No pulmonary arterial emboli.    Bilateral mid to upper lung predominant groundglass opacities with interlobular septal thickening suggestive of pulmonary edema.    Asymmetric right mid to upper lung dependent patchy opacity suggestive of atelectasis however,  clinical correlation for superimposed pneumonia is recommended.    Urinary bladder wall thickening with surrounding mild perivesicular stranding which can be seen in the setting of cystitis. Clinical correlation is recommended.    A few scattered bony sclerotic lesions as described above worrisome for metastatic disease given  the history of prostate cancer.     Supraumbilical anterior abdominal wall fat-containing hernia. 1.1 cm internal hypodense nodule with interval increase in size since May 29, 2024 is of unclear etiology   2024 Pet/Ct [facility]      REPRODUCTIVE ORGANS: Prostate gland measures 5.3 x 4.1 cm (image activity of the prostate gland  corresponds to the urinary excretion in via Coronel catheter.). Heterogeneous, intense uptake is seen  along the RIGHT lateral peripheral zone, RIGHT posterolateral peripheral zone and RIGHT posterior  midline extending from base to apex (SUV 11.2, image 210). Activity in the center of the prostate  gland corresponds to the urinary excretion in via Coronel catheter.    ABDOMINOPELVIC LYMPH NODES/RETROPERITONEUM: Pelvic nodes are seen with increased uptake. For  example, a RIGHT pelvic sidewall node (1.0 x 0.7 cm, SUV 4.9, image 188).    BONES/SOFT TISSUES: Foci of increased skeletal uptake, including spine, ribs, pelvic bones and  proximal LEFT femur. For reference, the lesion in L1 demonstrates SUV 6.0, a sclerotic lesion in the posterior LEFT iliac measures 1.3 cm with SUV 3.6.    VESSELS: Atherosclerotic calcification of nonaneurysmal abdominal aorta including visceral and/or  runoff branches.       IMPRESSION:    1.  Uptake in the prostate gland consistent with malignant involvement.    2.  Right pelvic sidewall nodes with increased PSMA expression consistent with metastatic disease.    3.  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        FHx: Mother: Neck mass excised and reproductive cancer  at 86yo  Father: Stroke  from artherosclerosis at 60yo MGrandfather - Stomach cancer  at 81yo  MGrandmother: Reproductive cancer  at 74yo   SurgHx: Scoped urologically and Coronel placed  SocialHx: Smoked from 15yo to 51yo  Social ETOH user  Never illicit drugs  [de-identified] : Patient is seen today for follow up Accompanied by wife [Nurys], son [Naveen]   Pt overall feels "fine,"  Complains of his indwelling bladder catheter that is attached to his leg. Schedule for greenlight laser surgery with Dr Reynolds (9/9/24)

## 2024-09-05 NOTE — HISTORY OF PRESENT ILLNESS
[de-identified] : Mr. Don Hernandez is 79 years old male with prostate cancer here for consultation, referred by Dr.Justen Cassidy Patient is experiencing some hematuria due to the coronel and has trouble doing his ADL's due to the coronel.   Patient with hx of A fib, PVCs, UTI   2024' Final Diagnosis 1.  Right anterior: -   Prostatic adenocarcinoma ([place]'s pattern 4+4 total score 8) grade group 4 involving 60% of the tissue and measuring 6 mm 2.  Right lateral: -   Prostatic adenocarcinoma (Sky's pattern 4+4 total score 8) grade group 4 involving 70% of the tissue and measuring 6 mm 3.  Right base posterior : -   Prostatic adenocarcinoma (Sky's pattern 4+5 total score 9) grade group 5 involving 100% and 100% of 2 of 2 cores and measuring 5 mm and 4 mm 4.  Right apex posterior: -   Prostatic adenocarcinoma (Sky's pattern 4+4 total score 8) grade group 4 involving 90% of the tissue and measuring 16 mm 5.  Right base posterior medial: -   Benign fibromuscular tissue 6.  Right apex posterior medial: -   Benign prostatic tissue 7.  Left anterior: -   Benign prostatic tissue 8.  Left lateral: -   Prostatic adenocarcinoma ([place]'s pattern 3+3 total score 6) grade group 1 involving 5% of the tissue and measuring 1 mm 9.  Left base posterior: -   Benign prostatic tissue 10.  Left apex posterior: -   Prostatic adenocarcinoma ([place]'s pattern 3+3 total score 6) grade group 1 involving 20% of multiple fragmented cores and measuring 2.5 mm 11.  Left base posterior medial: -   Benign prostatic tissue 12.  Left apex posterior medial: -   Prostatic adenocarcinoma ([place]'s pattern 3+3 total score 6) grade group 1 involving 40% of the tissue and measuring 4 mm 13.  Left transition zone: -   Benign prostatic tissue 14.  Right transition zone: -   Benign prostatic tissue  2024 bone scan - no definite scintigraphic evidence of bony metastatic disease.  2024 CT C/A/P No pulmonary arterial emboli.    Bilateral mid to upper lung predominant groundglass opacities with interlobular septal thickening suggestive of pulmonary edema.    Asymmetric right mid to upper lung dependent patchy opacity suggestive of atelectasis however,  clinical correlation for superimposed pneumonia is recommended.    Urinary bladder wall thickening with surrounding mild perivesicular stranding which can be seen in the setting of cystitis. Clinical correlation is recommended.    A few scattered bony sclerotic lesions as described above worrisome for metastatic disease given  the history of prostate cancer.     Supraumbilical anterior abdominal wall fat-containing hernia. 1.1 cm internal hypodense nodule with interval increase in size since May 29, 2024 is of unclear etiology   2024 Pet/Ct PSMA      REPRODUCTIVE ORGANS: Prostate gland measures 5.3 x 4.1 cm (image activity of the prostate gland  corresponds to the urinary excretion in via Coronel catheter.). Heterogeneous, intense uptake is seen  along the RIGHT lateral peripheral zone, RIGHT posterolateral peripheral zone and RIGHT posterior  midline extending from base to apex (SUV 11.2, image 210). Activity in the center of the prostate  gland corresponds to the urinary excretion in via Coronel catheter.    ABDOMINOPELVIC LYMPH NODES/RETROPERITONEUM: Pelvic nodes are seen with increased uptake. For  example, a RIGHT pelvic sidewall node (1.0 x 0.7 cm, SUV 4.9, image 188).    BONES/SOFT TISSUES: Foci of increased skeletal uptake, including spine, ribs, pelvic bones and  proximal LEFT femur. For reference, the lesion in L1 demonstrates SUV 6.0, a sclerotic lesion in the posterior LEFT iliac measures 1.3 cm with SUV 3.6.    VESSELS: Atherosclerotic calcification of nonaneurysmal abdominal aorta including visceral and/or  runoff branches.       IMPRESSION:    1.  Uptake in the prostate gland consistent with malignant involvement.    2.  Right pelvic sidewall nodes with increased PSMA expression consistent with metastatic disease.    3.  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        FHx: Mother: Neck mass excised and reproductive cancer  at 86yo  Father: Stroke  from artherosclerosis at 60yo MGrandfather - Stomach cancer  at 81yo  MGrandmother: Reproductive cancer  at 72yo   SurgHx: Scoped urologically and Coronel placed  SocialHx: Smoked from 17yo to 51yo  Social ETOH user  Never illicit drugs  [de-identified] : Patient is seen today for follow up Accompanied by wife [Nurys], son [Naveen]   Pt overall feels "fine,"  Complains of his indwelling bladder catheter that is attached to his leg. Schedule for greenlight laser surgery with Dr Reynolds (9/9/24)

## 2024-09-05 NOTE — PHYSICAL EXAM
[Ambulatory and capable of all self care but unable to carry out any work activities] : Status 2- Ambulatory and capable of all self care but unable to carry out any work activities. Up and about more than 50% of waking hours [Normal] : affect appropriate [de-identified] : Has indwelling Mcneal

## 2024-09-10 ENCOUNTER — TRANSCRIPTION ENCOUNTER (OUTPATIENT)
Age: 79
End: 2024-09-10

## 2024-10-03 ENCOUNTER — RESULT REVIEW (OUTPATIENT)
Age: 79
End: 2024-10-03

## 2024-10-03 ENCOUNTER — APPOINTMENT (OUTPATIENT)
Dept: HEMATOLOGY ONCOLOGY | Facility: CLINIC | Age: 79
End: 2024-10-03
Payer: MEDICARE

## 2024-10-03 ENCOUNTER — NON-APPOINTMENT (OUTPATIENT)
Age: 79
End: 2024-10-03

## 2024-10-03 VITALS
TEMPERATURE: 97.4 F | HEART RATE: 72 BPM | RESPIRATION RATE: 16 BRPM | SYSTOLIC BLOOD PRESSURE: 124 MMHG | OXYGEN SATURATION: 96 % | BODY MASS INDEX: 29.16 KG/M2 | WEIGHT: 208.31 LBS | DIASTOLIC BLOOD PRESSURE: 72 MMHG | HEIGHT: 71 IN

## 2024-10-03 DIAGNOSIS — G89.3 NEOPLASM RELATED PAIN (ACUTE) (CHRONIC): ICD-10-CM

## 2024-10-03 DIAGNOSIS — C61 MALIGNANT NEOPLASM OF PROSTATE: ICD-10-CM

## 2024-10-03 DIAGNOSIS — C79.51 MALIGNANT NEOPLASM OF PROSTATE: ICD-10-CM

## 2024-10-03 DIAGNOSIS — C79.51 NEOPLASM RELATED PAIN (ACUTE) (CHRONIC): ICD-10-CM

## 2024-10-03 PROCEDURE — 36415 COLL VENOUS BLD VENIPUNCTURE: CPT

## 2024-10-03 PROCEDURE — G2211 COMPLEX E/M VISIT ADD ON: CPT

## 2024-10-03 PROCEDURE — 99214 OFFICE O/P EST MOD 30 MIN: CPT

## 2024-10-03 NOTE — PHYSICAL EXAM
[Ambulatory and capable of all self care but unable to carry out any work activities] : Status 2- Ambulatory and capable of all self care but unable to carry out any work activities. Up and about more than 50% of waking hours [Normal] : affect appropriate [de-identified] : Has indwelling Mcneal

## 2024-10-03 NOTE — PHYSICAL EXAM
[Ambulatory and capable of all self care but unable to carry out any work activities] : Status 2- Ambulatory and capable of all self care but unable to carry out any work activities. Up and about more than 50% of waking hours [Normal] : affect appropriate [de-identified] : Has indwelling Mcneal

## 2024-10-03 NOTE — HISTORY OF PRESENT ILLNESS
[de-identified] : Mr. Don Hernandez is 79 years old male with prostate cancer here for consultation, referred by Dr.Justen Cassidy Patient is experiencing some hematuria due to the coronel and has trouble doing his ADL's due to the coronel.   Patient with hx of A fib, PVCs, UTI   2024' Final Diagnosis 1.  Right anterior: -   Prostatic adenocarcinoma ([place]'s pattern 4+4 total score 8) grade group 4 involving 60% of the tissue and measuring 6 mm 2.  Right lateral: -   Prostatic adenocarcinoma (Sky's pattern 4+4 total score 8) grade group 4 involving 70% of the tissue and measuring 6 mm 3.  Right base posterior : -   Prostatic adenocarcinoma (Sky's pattern 4+5 total score 9) grade group 5 involving 100% and 100% of 2 of 2 cores and measuring 5 mm and 4 mm 4.  Right apex posterior: -   Prostatic adenocarcinoma (Sky's pattern 4+4 total score 8) grade group 4 involving 90% of the tissue and measuring 16 mm 5.  Right base posterior medial: -   Benign fibromuscular tissue 6.  Right apex posterior medial: -   Benign prostatic tissue 7.  Left anterior: -   Benign prostatic tissue 8.  Left lateral: -   Prostatic adenocarcinoma ([place]'s pattern 3+3 total score 6) grade group 1 involving 5% of the tissue and measuring 1 mm 9.  Left base posterior: -   Benign prostatic tissue 10.  Left apex posterior: -   Prostatic adenocarcinoma ([place]'s pattern 3+3 total score 6) grade group 1 involving 20% of multiple fragmented cores and measuring 2.5 mm 11.  Left base posterior medial: -   Benign prostatic tissue 12.  Left apex posterior medial: -   Prostatic adenocarcinoma ([place]'s pattern 3+3 total score 6) grade group 1 involving 40% of the tissue and measuring 4 mm 13.  Left transition zone: -   Benign prostatic tissue 14.  Right transition zone: -   Benign prostatic tissue  2024 bone scan - no definite scintigraphic evidence of bony metastatic disease.  2024 CT C/A/P No pulmonary arterial emboli.    Bilateral mid to upper lung predominant groundglass opacities with interlobular septal thickening suggestive of pulmonary edema.    Asymmetric right mid to upper lung dependent patchy opacity suggestive of atelectasis however,  clinical correlation for superimposed pneumonia is recommended.    Urinary bladder wall thickening with surrounding mild perivesicular stranding which can be seen in the setting of cystitis. Clinical correlation is recommended.    A few scattered bony sclerotic lesions as described above worrisome for metastatic disease given  the history of prostate cancer.     Supraumbilical anterior abdominal wall fat-containing hernia. 1.1 cm internal hypodense nodule with interval increase in size since May 29, 2024 is of unclear etiology   2024 Pet/Ct PSMA      REPRODUCTIVE ORGANS: Prostate gland measures 5.3 x 4.1 cm (image activity of the prostate gland  corresponds to the urinary excretion in via Coronel catheter.). Heterogeneous, intense uptake is seen  along the RIGHT lateral peripheral zone, RIGHT posterolateral peripheral zone and RIGHT posterior  midline extending from base to apex (SUV 11.2, image 210). Activity in the center of the prostate  gland corresponds to the urinary excretion in via Coronel catheter.    ABDOMINOPELVIC LYMPH NODES/RETROPERITONEUM: Pelvic nodes are seen with increased uptake. For  example, a RIGHT pelvic sidewall node (1.0 x 0.7 cm, SUV 4.9, image 188).    BONES/SOFT TISSUES: Foci of increased skeletal uptake, including spine, ribs, pelvic bones and  proximal LEFT femur. For reference, the lesion in L1 demonstrates SUV 6.0, a sclerotic lesion in the posterior LEFT iliac measures 1.3 cm with SUV 3.6.    VESSELS: Atherosclerotic calcification of nonaneurysmal abdominal aorta including visceral and/or  runoff branches.       IMPRESSION:    1.  Uptake in the prostate gland consistent with malignant involvement.    2.  Right pelvic sidewall nodes with increased PSMA expression consistent with metastatic disease.    3.  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        FHx: Mother: Neck mass excised and reproductive cancer  at 88yo  Father: Stroke  from artherosclerosis at 60yo MGrandfather - Stomach cancer  at 79yo  MGrandmother: Reproductive cancer  at 74yo   SurgHx: Scoped urologically and Coronel placed  SocialHx: Smoked from 15yo to 51yo  Social ETOH user  Never illicit drugs  [de-identified] : Patient is seen today for follow up He started abiraterone prednisone (9/16/24-present) Accompanied by wife [Nurys]  He feels much better He is s/p greenlight laser surgery with Dr Reynolds (9/9/24) and indwelling cath was discontinued 9/11/24 He has good urine flow, with occasional hematuria he has been taking abiraterone prednisone x 2 weeks. Takes 2 pills in AM and 2 pills in PM. I advised him to take all 4 pills together  He gets occasional hot flashes.  He saw Dr Olivia (Dentist) on 9/27/24-> scheduled for extractions next week Explained that he cannot get bone agent for 2 months

## 2024-10-03 NOTE — BEGINNING OF VISIT
[0] : 2) Feeling down, depressed, or hopeless: Not at all (0) [PHQ-2 Negative] : PHQ-2 Negative [Pain Scale: ___] : On a scale of 1-10, today the patient's pain is a(n) [unfilled]. [Reviewed, no changes] : Reviewed, no changes [[id number]] : 0

## 2024-10-03 NOTE — HISTORY OF PRESENT ILLNESS
[de-identified] : Mr. Don Hernandez is 79 years old male with prostate cancer here for consultation, referred by Dr.Justen Cassidy Patient is experiencing some hematuria due to the coronel and has trouble doing his ADL's due to the coronel.   Patient with hx of A fib, PVCs, UTI   2024' Final Diagnosis 1.  Right anterior: -   Prostatic adenocarcinoma ([place]'s pattern 4+4 total score 8) grade group 4 involving 60% of the tissue and measuring 6 mm 2.  Right lateral: -   Prostatic adenocarcinoma (Sky's pattern 4+4 total score 8) grade group 4 involving 70% of the tissue and measuring 6 mm 3.  Right base posterior : -   Prostatic adenocarcinoma (Sky's pattern 4+5 total score 9) grade group 5 involving 100% and 100% of 2 of 2 cores and measuring 5 mm and 4 mm 4.  Right apex posterior: -   Prostatic adenocarcinoma (Sky's pattern 4+4 total score 8) grade group 4 involving 90% of the tissue and measuring 16 mm 5.  Right base posterior medial: -   Benign fibromuscular tissue 6.  Right apex posterior medial: -   Benign prostatic tissue 7.  Left anterior: -   Benign prostatic tissue 8.  Left lateral: -   Prostatic adenocarcinoma ([place]'s pattern 3+3 total score 6) grade group 1 involving 5% of the tissue and measuring 1 mm 9.  Left base posterior: -   Benign prostatic tissue 10.  Left apex posterior: -   Prostatic adenocarcinoma ([place]'s pattern 3+3 total score 6) grade group 1 involving 20% of multiple fragmented cores and measuring 2.5 mm 11.  Left base posterior medial: -   Benign prostatic tissue 12.  Left apex posterior medial: -   Prostatic adenocarcinoma ([place]'s pattern 3+3 total score 6) grade group 1 involving 40% of the tissue and measuring 4 mm 13.  Left transition zone: -   Benign prostatic tissue 14.  Right transition zone: -   Benign prostatic tissue  2024 bone scan - no definite scintigraphic evidence of bony metastatic disease.  2024 CT C/A/P No pulmonary arterial emboli.    Bilateral mid to upper lung predominant groundglass opacities with interlobular septal thickening suggestive of pulmonary edema.    Asymmetric right mid to upper lung dependent patchy opacity suggestive of atelectasis however,  clinical correlation for superimposed pneumonia is recommended.    Urinary bladder wall thickening with surrounding mild perivesicular stranding which can be seen in the setting of cystitis. Clinical correlation is recommended.    A few scattered bony sclerotic lesions as described above worrisome for metastatic disease given  the history of prostate cancer.     Supraumbilical anterior abdominal wall fat-containing hernia. 1.1 cm internal hypodense nodule with interval increase in size since May 29, 2024 is of unclear etiology   2024 Pet/Ct PSMA      REPRODUCTIVE ORGANS: Prostate gland measures 5.3 x 4.1 cm (image activity of the prostate gland  corresponds to the urinary excretion in via Coronel catheter.). Heterogeneous, intense uptake is seen  along the RIGHT lateral peripheral zone, RIGHT posterolateral peripheral zone and RIGHT posterior  midline extending from base to apex (SUV 11.2, image 210). Activity in the center of the prostate  gland corresponds to the urinary excretion in via Coronel catheter.    ABDOMINOPELVIC LYMPH NODES/RETROPERITONEUM: Pelvic nodes are seen with increased uptake. For  example, a RIGHT pelvic sidewall node (1.0 x 0.7 cm, SUV 4.9, image 188).    BONES/SOFT TISSUES: Foci of increased skeletal uptake, including spine, ribs, pelvic bones and  proximal LEFT femur. For reference, the lesion in L1 demonstrates SUV 6.0, a sclerotic lesion in the posterior LEFT iliac measures 1.3 cm with SUV 3.6.    VESSELS: Atherosclerotic calcification of nonaneurysmal abdominal aorta including visceral and/or  runoff branches.       IMPRESSION:    1.  Uptake in the prostate gland consistent with malignant involvement.    2.  Right pelvic sidewall nodes with increased PSMA expression consistent with metastatic disease.    3.  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        FHx: Mother: Neck mass excised and reproductive cancer  at 86yo  Father: Stroke  from artherosclerosis at 58yo MGrandfather - Stomach cancer  at 81yo  MGrandmother: Reproductive cancer  at 74yo   SurgHx: Scoped urologically and Coronel placed  SocialHx: Smoked from 17yo to 51yo  Social ETOH user  Never illicit drugs  [de-identified] : Patient is seen today for follow up He started abiraterone prednisone (9/16/24-present) Accompanied by wife [Nurys]  He feels much better He is s/p greenlight laser surgery with Dr Reynolds (9/9/24) and indwelling cath was discontinued 9/11/24 He has good urine flow, with occasional hematuria he has been taking abiraterone prednisone x 2 weeks. Takes 2 pills in AM and 2 pills in PM. I advised him to take all 4 pills together  He gets occasional hot flashes.  He saw Dr Olivia (Dentist) on 9/27/24-> scheduled for extractions next week Explained that he cannot get bone agent for 2 months

## 2024-10-03 NOTE — BEGINNING OF VISIT
[0] : 2) Feeling down, depressed, or hopeless: Not at all (0) [PHQ-2 Negative] : PHQ-2 Negative [Pain Scale: ___] : On a scale of 1-10, today the patient's pain is a(n) [unfilled]. [Reviewed, no changes] : Reviewed, no changes [[id number]] : 0

## 2024-10-03 NOTE — ASSESSMENT
[FreeTextEntry1] : Prostate Cancer Sky 4+5=9 Diagnosed 7/5/24 PSMA PET (8/6/24): Uptake in prostate, right pelvic side wall and bone mets in axial and appendicular Follows with Dr Reynolds Symptoms: frequent urination until May 2024- he became obstructed s/p Foleys  No pain  He is seen today for follow-up started abiraterone prednisone (9/16/24-present) He is taking abiraterone 2 pills in the morning and 2 pills at night.  Advised him to take 4 pills altogether in the morning. No further episodes of syncope/presyncope He has discontinued Casodex Due for Lupron in November 2024 which she will get with Dr Reynolds Recommend Zometa every 3 months He has seen dentist [Dr. Olivia] and requires dental extraction which is scheduled for next week Explained that we will hold off on the bone agent until he obtains dental clearance  Plan: Abiraterone 1000 mg p.o. prednisone 5 mg daily Lupron: He received his first dose of 22.5 mg with Dr. Reynolds on 8/16/2024.  He will continue every 3 months Lupron with his urologist Zometa every 3 months-once he obtains dental clearance  Social Hx Lives in [place], [place] with wife Retired  at [employer] Smoked from 15yo to 53yo Social ETOH user Never illicit drugs.  Patient, wife [Nurys], had multiple questions which were answered to satisfaction  Follow-up in 2 weeks for 2 to 3 months to monitor labs on abiraterone CBC, CMP, PSA  .

## 2024-10-03 NOTE — ASSESSMENT
[FreeTextEntry1] : Prostate Cancer Sky 4+5=9 Diagnosed 7/5/24 [facility] PET (8/6/24): Uptake in prostate, right pelvic side wall and bone mets in axial and appendicular Follows with Dr Reynolds Symptoms: frequent urination until May 2024- he became obstructed s/p Foleys  No pain  He is seen today for follow-up started abiraterone prednisone (9/16/24-present) He is taking abiraterone 2 pills in the morning and 2 pills at night.  Advised him to take 4 pills altogether in the morning. No further episodes of syncope/presyncope He has discontinued Casodex Due for Lupron in November 2024 which she will get with Dr Reynolds Recommend Zometa every 3 months He has seen dentist [Dr. Olivia] and requires dental extraction which is scheduled for next week Explained that we will hold off on the bone agent until he obtains dental clearance  Plan: Abiraterone 1000 mg p.o. prednisone 5 mg daily Lupron: He received his first dose of 22.5 mg with Dr. Reynolds on 8/16/2024.  He will continue every 3 months Lupron with his urologist Zometa every 3 months-once he obtains dental clearance  Social Hx Lives in [place], [place] with wife Retired  at [employer] Smoked from 15yo to 53yo Social ETOH user Never illicit drugs.  Patient, wife [Nurys], had multiple questions which were answered to satisfaction  Follow-up in 2 weeks for 2 to 3 months to monitor labs on abiraterone CBC, CMP, PSA  .

## 2024-10-03 NOTE — PHYSICAL EXAM
[Ambulatory and capable of all self care but unable to carry out any work activities] : Status 2- Ambulatory and capable of all self care but unable to carry out any work activities. Up and about more than 50% of waking hours [Normal] : affect appropriate [de-identified] : Has indwelling Mnceal

## 2024-10-03 NOTE — ASSESSMENT
[FreeTextEntry1] : Prostate Cancer Sky 4+5=9 Diagnosed 7/5/24 PSMA PET (8/6/24): Uptake in prostate, right pelvic side wall and bone mets in axial and appendicular Follows with Dr Reynolds Symptoms: frequent urination until May 2024- he became obstructed s/p Foleys  No pain  He is seen today for follow-up started abiraterone prednisone (9/16/24-present) He is taking abiraterone 2 pills in the morning and 2 pills at night.  Advised him to take 4 pills altogether in the morning. No further episodes of syncope/presyncope He has discontinued Casodex Due for Lupron in November 2024 which she will get with Dr Reynolds Recommend Zometa every 3 months He has seen dentist [Dr. Olivia] and requires dental extraction which is scheduled for next week Explained that we will hold off on the bone agent until he obtains dental clearance  Plan: Abiraterone 1000 mg p.o. prednisone 5 mg daily Lupron: He received his first dose of 22.5 mg with Dr. Reynolds on 8/16/2024.  He will continue every 3 months Lupron with his urologist Zometa every 3 months-once he obtains dental clearance  Social Hx Lives in [place], [place] with wife Retired  at [employer] Smoked from 17yo to 53yo Social ETOH user Never illicit drugs.  Patient, wife [Nurys], had multiple questions which were answered to satisfaction  Follow-up in 2 weeks for 2 to 3 months to monitor labs on abiraterone CBC, CMP, PSA  .

## 2024-10-03 NOTE — BEGINNING OF VISIT
[0] : 2) Feeling down, depressed, or hopeless: Not at all (0) [PHQ-2 Negative] : PHQ-2 Negative [Pain Scale: ___] : On a scale of 1-10, today the patient's pain is a(n) [unfilled]. [Reviewed, no changes] : Reviewed, no changes [[id number]] : 0

## 2024-10-03 NOTE — HISTORY OF PRESENT ILLNESS
[de-identified] : Mr. Don Hernandez is 79 years old male with prostate cancer here for consultation, referred by Dr.Justen Cassidy Patient is experiencing some hematuria due to the coronel and has trouble doing his ADL's due to the coronel.   Patient with hx of A fib, PVCs, UTI   2024' Final Diagnosis 1.  Right anterior: -   Prostatic adenocarcinoma ([place]'s pattern 4+4 total score 8) grade group 4 involving 60% of the tissue and measuring 6 mm 2.  Right lateral: -   Prostatic adenocarcinoma ([place]'s pattern 4+4 total score 8) grade group 4 involving 70% of the tissue and measuring 6 mm 3.  Right base posterior : -   Prostatic adenocarcinoma (Sky's pattern 4+5 total score 9) grade group 5 involving 100% and 100% of 2 of 2 cores and measuring 5 mm and 4 mm 4.  Right apex posterior: -   Prostatic adenocarcinoma (Sky's pattern 4+4 total score 8) grade group 4 involving 90% of the tissue and measuring 16 mm 5.  Right base posterior medial: -   Benign fibromuscular tissue 6.  Right apex posterior medial: -   Benign prostatic tissue 7.  Left anterior: -   Benign prostatic tissue 8.  Left lateral: -   Prostatic adenocarcinoma ([place]'s pattern 3+3 total score 6) grade group 1 involving 5% of the tissue and measuring 1 mm 9.  Left base posterior: -   Benign prostatic tissue 10.  Left apex posterior: -   Prostatic adenocarcinoma ([place]'s pattern 3+3 total score 6) grade group 1 involving 20% of multiple fragmented cores and measuring 2.5 mm 11.  Left base posterior medial: -   Benign prostatic tissue 12.  Left apex posterior medial: -   Prostatic adenocarcinoma ([place]'s pattern 3+3 total score 6) grade group 1 involving 40% of the tissue and measuring 4 mm 13.  Left transition zone: -   Benign prostatic tissue 14.  Right transition zone: -   Benign prostatic tissue  2024 bone scan - no definite scintigraphic evidence of bony metastatic disease.  2024 CT C/A/P No pulmonary arterial emboli.    Bilateral mid to upper lung predominant groundglass opacities with interlobular septal thickening suggestive of pulmonary edema.    Asymmetric right mid to upper lung dependent patchy opacity suggestive of atelectasis however,  clinical correlation for superimposed pneumonia is recommended.    Urinary bladder wall thickening with surrounding mild perivesicular stranding which can be seen in the setting of cystitis. Clinical correlation is recommended.    A few scattered bony sclerotic lesions as described above worrisome for metastatic disease given  the history of prostate cancer.     Supraumbilical anterior abdominal wall fat-containing hernia. 1.1 cm internal hypodense nodule with interval increase in size since May 29, 2024 is of unclear etiology   2024 Pet/Ct [facility]      REPRODUCTIVE ORGANS: Prostate gland measures 5.3 x 4.1 cm (image activity of the prostate gland  corresponds to the urinary excretion in via Coronel catheter.). Heterogeneous, intense uptake is seen  along the RIGHT lateral peripheral zone, RIGHT posterolateral peripheral zone and RIGHT posterior  midline extending from base to apex (SUV 11.2, image 210). Activity in the center of the prostate  gland corresponds to the urinary excretion in via Coronel catheter.    ABDOMINOPELVIC LYMPH NODES/RETROPERITONEUM: Pelvic nodes are seen with increased uptake. For  example, a RIGHT pelvic sidewall node (1.0 x 0.7 cm, SUV 4.9, image 188).    BONES/SOFT TISSUES: Foci of increased skeletal uptake, including spine, ribs, pelvic bones and  proximal LEFT femur. For reference, the lesion in L1 demonstrates SUV 6.0, a sclerotic lesion in the posterior LEFT iliac measures 1.3 cm with SUV 3.6.    VESSELS: Atherosclerotic calcification of nonaneurysmal abdominal aorta including visceral and/or  runoff branches.       IMPRESSION:    1.  Uptake in the prostate gland consistent with malignant involvement.    2.  Right pelvic sidewall nodes with increased PSMA expression consistent with metastatic disease.    3.  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        FHx: Mother: Neck mass excised and reproductive cancer  at 86yo  Father: Stroke  from artherosclerosis at 58yo MGrandfather - Stomach cancer  at 81yo  MGrandmother: Reproductive cancer  at 72yo   SurgHx: Scoped urologically and Coronel placed  SocialHx: Smoked from 15yo to 53yo  Social ETOH user  Never illicit drugs  [de-identified] : Patient is seen today for follow up He started abiraterone prednisone (9/16/24-present) Accompanied by wife [Nurys]  He feels much better He is s/p greenlight laser surgery with Dr Reynolds (9/9/24) and indwelling cath was discontinued 9/11/24 He has good urine flow, with occasional hematuria he has been taking abiraterone prednisone x 2 weeks. Takes 2 pills in AM and 2 pills in PM. I advised him to take all 4 pills together  He gets occasional hot flashes.  He saw Dr Olivia (Dentist) on 9/27/24-> scheduled for extractions next week Explained that he cannot get bone agent for 2 months

## 2024-10-23 ENCOUNTER — RESULT REVIEW (OUTPATIENT)
Age: 79
End: 2024-10-23

## 2024-10-23 ENCOUNTER — APPOINTMENT (OUTPATIENT)
Dept: HEMATOLOGY ONCOLOGY | Facility: CLINIC | Age: 79
End: 2024-10-23
Payer: MEDICARE

## 2024-10-23 VITALS
OXYGEN SATURATION: 97 % | SYSTOLIC BLOOD PRESSURE: 123 MMHG | BODY MASS INDEX: 29.15 KG/M2 | DIASTOLIC BLOOD PRESSURE: 75 MMHG | HEIGHT: 71 IN | TEMPERATURE: 98.1 F | HEART RATE: 70 BPM | WEIGHT: 208.19 LBS | RESPIRATION RATE: 16 BRPM

## 2024-10-23 DIAGNOSIS — C61 MALIGNANT NEOPLASM OF PROSTATE: ICD-10-CM

## 2024-10-23 DIAGNOSIS — C79.51 MALIGNANT NEOPLASM OF PROSTATE: ICD-10-CM

## 2024-10-23 PROCEDURE — 99214 OFFICE O/P EST MOD 30 MIN: CPT

## 2024-10-23 PROCEDURE — G2211 COMPLEX E/M VISIT ADD ON: CPT

## 2024-10-23 PROCEDURE — 36415 COLL VENOUS BLD VENIPUNCTURE: CPT

## 2024-10-24 ENCOUNTER — NON-APPOINTMENT (OUTPATIENT)
Age: 79
End: 2024-10-24

## 2024-10-24 ENCOUNTER — APPOINTMENT (OUTPATIENT)
Dept: HEART AND VASCULAR | Facility: CLINIC | Age: 79
End: 2024-10-24
Payer: MEDICARE

## 2024-10-24 VITALS
BODY MASS INDEX: 28.42 KG/M2 | DIASTOLIC BLOOD PRESSURE: 80 MMHG | OXYGEN SATURATION: 99 % | HEART RATE: 76 BPM | SYSTOLIC BLOOD PRESSURE: 130 MMHG | WEIGHT: 203 LBS | HEIGHT: 71 IN

## 2024-10-24 DIAGNOSIS — I49.3 VENTRICULAR PREMATURE DEPOLARIZATION: ICD-10-CM

## 2024-10-24 DIAGNOSIS — M79.89 OTHER SPECIFIED SOFT TISSUE DISORDERS: ICD-10-CM

## 2024-10-24 DIAGNOSIS — I25.10 ATHEROSCLEROTIC HEART DISEASE OF NATIVE CORONARY ARTERY W/OUT ANGINA PECTORIS: ICD-10-CM

## 2024-10-24 DIAGNOSIS — C61 MALIGNANT NEOPLASM OF PROSTATE: ICD-10-CM

## 2024-10-24 DIAGNOSIS — I70.0 ATHEROSCLEROSIS OF AORTA: ICD-10-CM

## 2024-10-24 DIAGNOSIS — I48.91 UNSPECIFIED ATRIAL FIBRILLATION: ICD-10-CM

## 2024-10-24 PROCEDURE — 99214 OFFICE O/P EST MOD 30 MIN: CPT

## 2024-10-24 PROCEDURE — 93000 ELECTROCARDIOGRAM COMPLETE: CPT

## 2024-10-24 PROCEDURE — G2211 COMPLEX E/M VISIT ADD ON: CPT

## 2024-10-25 LAB — NT-PROBNP SERPL-MCNC: 137 PG/ML

## 2024-11-08 ENCOUNTER — APPOINTMENT (OUTPATIENT)
Dept: HEMATOLOGY ONCOLOGY | Facility: CLINIC | Age: 79
End: 2024-11-08

## 2024-12-03 ENCOUNTER — APPOINTMENT (OUTPATIENT)
Dept: UROLOGY | Facility: CLINIC | Age: 79
End: 2024-12-03
Payer: MEDICARE

## 2024-12-03 VITALS
SYSTOLIC BLOOD PRESSURE: 164 MMHG | BODY MASS INDEX: 29.12 KG/M2 | DIASTOLIC BLOOD PRESSURE: 94 MMHG | HEIGHT: 71 IN | HEART RATE: 84 BPM | WEIGHT: 208 LBS

## 2024-12-03 DIAGNOSIS — C79.51 MALIGNANT NEOPLASM OF PROSTATE: ICD-10-CM

## 2024-12-03 DIAGNOSIS — C61 MALIGNANT NEOPLASM OF PROSTATE: ICD-10-CM

## 2024-12-03 PROCEDURE — 96402 CHEMO HORMON ANTINEOPL SQ/IM: CPT

## 2024-12-03 PROCEDURE — 99213 OFFICE O/P EST LOW 20 MIN: CPT | Mod: 25

## 2024-12-03 PROCEDURE — 99203 OFFICE O/P NEW LOW 30 MIN: CPT | Mod: 25

## 2025-01-02 ENCOUNTER — APPOINTMENT (OUTPATIENT)
Dept: HEMATOLOGY ONCOLOGY | Facility: CLINIC | Age: 80
End: 2025-01-02
Payer: MEDICARE

## 2025-01-02 ENCOUNTER — RESULT REVIEW (OUTPATIENT)
Age: 80
End: 2025-01-02

## 2025-01-02 VITALS
SYSTOLIC BLOOD PRESSURE: 146 MMHG | OXYGEN SATURATION: 96 % | DIASTOLIC BLOOD PRESSURE: 74 MMHG | RESPIRATION RATE: 16 BRPM | WEIGHT: 215 LBS | BODY MASS INDEX: 30.1 KG/M2 | HEIGHT: 71 IN | HEART RATE: 74 BPM | TEMPERATURE: 98.1 F

## 2025-01-02 DIAGNOSIS — C79.51 MALIGNANT NEOPLASM OF PROSTATE: ICD-10-CM

## 2025-01-02 DIAGNOSIS — C61 MALIGNANT NEOPLASM OF PROSTATE: ICD-10-CM

## 2025-01-02 PROCEDURE — 99214 OFFICE O/P EST MOD 30 MIN: CPT

## 2025-01-02 PROCEDURE — 36415 COLL VENOUS BLD VENIPUNCTURE: CPT

## 2025-01-02 PROCEDURE — G2211 COMPLEX E/M VISIT ADD ON: CPT

## 2025-02-07 ENCOUNTER — NON-APPOINTMENT (OUTPATIENT)
Age: 80
End: 2025-02-07

## 2025-02-07 ENCOUNTER — APPOINTMENT (OUTPATIENT)
Dept: HEART AND VASCULAR | Facility: CLINIC | Age: 80
End: 2025-02-07
Payer: MEDICARE

## 2025-02-07 VITALS
HEART RATE: 101 BPM | SYSTOLIC BLOOD PRESSURE: 130 MMHG | BODY MASS INDEX: 30.38 KG/M2 | HEIGHT: 71 IN | OXYGEN SATURATION: 99 % | WEIGHT: 217 LBS | DIASTOLIC BLOOD PRESSURE: 60 MMHG

## 2025-02-07 DIAGNOSIS — I25.10 ATHEROSCLEROTIC HEART DISEASE OF NATIVE CORONARY ARTERY W/OUT ANGINA PECTORIS: ICD-10-CM

## 2025-02-07 DIAGNOSIS — I48.91 UNSPECIFIED ATRIAL FIBRILLATION: ICD-10-CM

## 2025-02-07 DIAGNOSIS — I49.3 VENTRICULAR PREMATURE DEPOLARIZATION: ICD-10-CM

## 2025-02-07 DIAGNOSIS — I70.0 ATHEROSCLEROSIS OF AORTA: ICD-10-CM

## 2025-02-07 PROCEDURE — G2211 COMPLEX E/M VISIT ADD ON: CPT

## 2025-02-07 PROCEDURE — 99214 OFFICE O/P EST MOD 30 MIN: CPT

## 2025-02-07 PROCEDURE — 93000 ELECTROCARDIOGRAM COMPLETE: CPT

## 2025-03-04 ENCOUNTER — APPOINTMENT (OUTPATIENT)
Dept: UROLOGY | Facility: CLINIC | Age: 80
End: 2025-03-04
Payer: MEDICARE

## 2025-03-04 VITALS — BODY MASS INDEX: 30.27 KG/M2 | SYSTOLIC BLOOD PRESSURE: 141 MMHG | WEIGHT: 217 LBS | DIASTOLIC BLOOD PRESSURE: 86 MMHG

## 2025-03-04 DIAGNOSIS — C61 MALIGNANT NEOPLASM OF PROSTATE: ICD-10-CM

## 2025-03-04 DIAGNOSIS — C79.51 MALIGNANT NEOPLASM OF PROSTATE: ICD-10-CM

## 2025-03-04 PROCEDURE — 99213 OFFICE O/P EST LOW 20 MIN: CPT | Mod: 25

## 2025-03-04 PROCEDURE — 96402 CHEMO HORMON ANTINEOPL SQ/IM: CPT

## 2025-03-20 ENCOUNTER — RESULT REVIEW (OUTPATIENT)
Age: 80
End: 2025-03-20

## 2025-03-20 ENCOUNTER — APPOINTMENT (OUTPATIENT)
Dept: HEMATOLOGY ONCOLOGY | Facility: CLINIC | Age: 80
End: 2025-03-20
Payer: MEDICARE

## 2025-03-20 VITALS
HEIGHT: 71 IN | HEART RATE: 76 BPM | DIASTOLIC BLOOD PRESSURE: 78 MMHG | WEIGHT: 223.13 LBS | OXYGEN SATURATION: 96 % | TEMPERATURE: 97.2 F | BODY MASS INDEX: 31.24 KG/M2 | RESPIRATION RATE: 16 BRPM | SYSTOLIC BLOOD PRESSURE: 144 MMHG

## 2025-03-20 DIAGNOSIS — C61 MALIGNANT NEOPLASM OF PROSTATE: ICD-10-CM

## 2025-03-20 DIAGNOSIS — C79.51 MALIGNANT NEOPLASM OF PROSTATE: ICD-10-CM

## 2025-03-20 PROCEDURE — G2211 COMPLEX E/M VISIT ADD ON: CPT

## 2025-03-20 PROCEDURE — 99214 OFFICE O/P EST MOD 30 MIN: CPT

## 2025-03-20 PROCEDURE — 36415 COLL VENOUS BLD VENIPUNCTURE: CPT

## 2025-06-05 ENCOUNTER — APPOINTMENT (OUTPATIENT)
Dept: UROLOGY | Facility: CLINIC | Age: 80
End: 2025-06-05
Payer: MEDICARE

## 2025-06-05 VITALS — SYSTOLIC BLOOD PRESSURE: 146 MMHG | DIASTOLIC BLOOD PRESSURE: 89 MMHG | HEART RATE: 89 BPM

## 2025-06-05 DIAGNOSIS — C61 MALIGNANT NEOPLASM OF PROSTATE: ICD-10-CM

## 2025-06-05 DIAGNOSIS — C79.51 MALIGNANT NEOPLASM OF PROSTATE: ICD-10-CM

## 2025-06-05 PROCEDURE — 96402 CHEMO HORMON ANTINEOPL SQ/IM: CPT

## 2025-06-24 ENCOUNTER — RESULT REVIEW (OUTPATIENT)
Age: 80
End: 2025-06-24

## 2025-06-24 ENCOUNTER — APPOINTMENT (OUTPATIENT)
Dept: HEMATOLOGY ONCOLOGY | Facility: CLINIC | Age: 80
End: 2025-06-24
Payer: MEDICARE

## 2025-06-24 VITALS
RESPIRATION RATE: 16 BRPM | SYSTOLIC BLOOD PRESSURE: 151 MMHG | HEIGHT: 71 IN | DIASTOLIC BLOOD PRESSURE: 84 MMHG | WEIGHT: 230.44 LBS | HEART RATE: 79 BPM | BODY MASS INDEX: 32.26 KG/M2 | TEMPERATURE: 98 F | OXYGEN SATURATION: 97 %

## 2025-06-24 PROBLEM — Z79.69 ON ANTINEOPLASTIC CHEMOTHERAPY: Status: ACTIVE | Noted: 2025-06-24

## 2025-06-24 PROCEDURE — 99214 OFFICE O/P EST MOD 30 MIN: CPT

## 2025-06-24 PROCEDURE — G2211 COMPLEX E/M VISIT ADD ON: CPT

## 2025-06-24 PROCEDURE — 36415 COLL VENOUS BLD VENIPUNCTURE: CPT

## 2025-08-29 ENCOUNTER — APPOINTMENT (OUTPATIENT)
Dept: CARDIOLOGY | Facility: CLINIC | Age: 80
End: 2025-08-29
Payer: MEDICARE

## 2025-08-29 VITALS
HEART RATE: 81 BPM | DIASTOLIC BLOOD PRESSURE: 80 MMHG | SYSTOLIC BLOOD PRESSURE: 130 MMHG | BODY MASS INDEX: 31.1 KG/M2 | OXYGEN SATURATION: 95 % | WEIGHT: 223 LBS

## 2025-08-29 PROCEDURE — 99214 OFFICE O/P EST MOD 30 MIN: CPT

## 2025-08-29 PROCEDURE — 93000 ELECTROCARDIOGRAM COMPLETE: CPT

## 2025-08-29 RX ORDER — CALCIUM CARBONATE/VITAMIN D3 600 MG-10
600-10 TABLET ORAL
Refills: 0 | Status: ACTIVE | COMMUNITY
Start: 2025-08-29

## 2025-09-04 ENCOUNTER — APPOINTMENT (OUTPATIENT)
Dept: CARDIOLOGY | Facility: CLINIC | Age: 80
End: 2025-09-04
Payer: MEDICARE

## 2025-09-04 ENCOUNTER — APPOINTMENT (OUTPATIENT)
Dept: UROLOGY | Facility: CLINIC | Age: 80
End: 2025-09-04
Payer: MEDICARE

## 2025-09-04 VITALS
DIASTOLIC BLOOD PRESSURE: 70 MMHG | HEART RATE: 82 BPM | HEIGHT: 71 IN | BODY MASS INDEX: 30.8 KG/M2 | SYSTOLIC BLOOD PRESSURE: 136 MMHG | WEIGHT: 220 LBS | OXYGEN SATURATION: 97 %

## 2025-09-04 DIAGNOSIS — C61 MALIGNANT NEOPLASM OF PROSTATE: ICD-10-CM

## 2025-09-04 DIAGNOSIS — I25.10 ATHEROSCLEROTIC HEART DISEASE OF NATIVE CORONARY ARTERY W/OUT ANGINA PECTORIS: ICD-10-CM

## 2025-09-04 DIAGNOSIS — I49.3 VENTRICULAR PREMATURE DEPOLARIZATION: ICD-10-CM

## 2025-09-04 DIAGNOSIS — R03.0 ELEVATED BLOOD-PRESSURE READING, W/OUT DIAGNOSIS OF HYPERTENSION: ICD-10-CM

## 2025-09-04 DIAGNOSIS — I48.91 UNSPECIFIED ATRIAL FIBRILLATION: ICD-10-CM

## 2025-09-04 PROCEDURE — 96402 CHEMO HORMON ANTINEOPL SQ/IM: CPT

## 2025-09-04 PROCEDURE — 93000 ELECTROCARDIOGRAM COMPLETE: CPT

## 2025-09-04 PROCEDURE — 99214 OFFICE O/P EST MOD 30 MIN: CPT

## 2025-09-23 PROBLEM — R23.2 HOT FLASHES: Status: ACTIVE | Noted: 2025-09-23
